# Patient Record
Sex: MALE | NOT HISPANIC OR LATINO | Employment: STUDENT | URBAN - METROPOLITAN AREA
[De-identification: names, ages, dates, MRNs, and addresses within clinical notes are randomized per-mention and may not be internally consistent; named-entity substitution may affect disease eponyms.]

---

## 2018-08-13 ENCOUNTER — APPOINTMENT (OUTPATIENT)
Dept: CT IMAGING | Facility: CLINIC | Age: 28
End: 2018-08-13
Attending: EMERGENCY MEDICINE
Payer: MEDICAID

## 2018-08-13 ENCOUNTER — HOSPITAL ENCOUNTER (EMERGENCY)
Facility: CLINIC | Age: 28
Discharge: HOME | End: 2018-08-13
Attending: EMERGENCY MEDICINE | Admitting: EMERGENCY MEDICINE
Payer: MEDICAID

## 2018-08-13 VITALS
HEIGHT: 72 IN | BODY MASS INDEX: 26.01 KG/M2 | DIASTOLIC BLOOD PRESSURE: 93 MMHG | HEART RATE: 95 BPM | SYSTOLIC BLOOD PRESSURE: 139 MMHG | WEIGHT: 192 LBS | RESPIRATION RATE: 14 BRPM | TEMPERATURE: 99.1 F | OXYGEN SATURATION: 100 %

## 2018-08-13 DIAGNOSIS — J45.20 MILD INTERMITTENT REACTIVE AIRWAY DISEASE WITHOUT COMPLICATION: ICD-10-CM

## 2018-08-13 DIAGNOSIS — R07.89 OTHER CHEST PAIN: ICD-10-CM

## 2018-08-13 DIAGNOSIS — J18.9 PNEUMONIA DUE TO INFECTIOUS ORGANISM, UNSPECIFIED LATERALITY, UNSPECIFIED PART OF LUNG: ICD-10-CM

## 2018-08-13 DIAGNOSIS — I10 HYPERTENSION, UNSPECIFIED TYPE: ICD-10-CM

## 2018-08-13 LAB
ANION GAP SERPL CALCULATED.3IONS-SCNC: 5 MMOL/L (ref 3–14)
APTT PPP: 34 SEC (ref 22–37)
BASOPHILS # BLD AUTO: 0.1 10E9/L (ref 0–0.2)
BASOPHILS NFR BLD AUTO: 0.4 %
BUN SERPL-MCNC: 10 MG/DL (ref 7–30)
CALCIUM SERPL-MCNC: 9.1 MG/DL (ref 8.5–10.1)
CHLORIDE SERPL-SCNC: 102 MMOL/L (ref 94–109)
CO2 SERPL-SCNC: 27 MMOL/L (ref 20–32)
CREAT SERPL-MCNC: 0.99 MG/DL (ref 0.66–1.25)
DIFFERENTIAL METHOD BLD: ABNORMAL
EOSINOPHIL # BLD AUTO: 0.3 10E9/L (ref 0–0.7)
EOSINOPHIL NFR BLD AUTO: 1.9 %
ERYTHROCYTE [DISTWIDTH] IN BLOOD BY AUTOMATED COUNT: 15.4 % (ref 10–15)
GFR SERPL CREATININE-BSD FRML MDRD: 89 ML/MIN/1.7M2
GLUCOSE SERPL-MCNC: 93 MG/DL (ref 70–99)
HCT VFR BLD AUTO: 40.5 % (ref 40–53)
HGB BLD-MCNC: 13.3 G/DL (ref 13.3–17.7)
IMM GRANULOCYTES # BLD: 0 10E9/L (ref 0–0.4)
IMM GRANULOCYTES NFR BLD: 0.2 %
INR PPP: 1.05 (ref 0.86–1.14)
LIPASE SERPL-CCNC: 115 U/L (ref 73–393)
LYMPHOCYTES # BLD AUTO: 2.7 10E9/L (ref 0.8–5.3)
LYMPHOCYTES NFR BLD AUTO: 16.8 %
MAGNESIUM SERPL-MCNC: 2 MG/DL (ref 1.6–2.3)
MCH RBC QN AUTO: 23.2 PG (ref 26.5–33)
MCHC RBC AUTO-ENTMCNC: 32.8 G/DL (ref 31.5–36.5)
MCV RBC AUTO: 71 FL (ref 78–100)
MONOCYTES # BLD AUTO: 2.2 10E9/L (ref 0–1.3)
MONOCYTES NFR BLD AUTO: 13.3 %
NEUTROPHILS # BLD AUTO: 10.9 10E9/L (ref 1.6–8.3)
NEUTROPHILS NFR BLD AUTO: 67.4 %
NRBC # BLD AUTO: 0 10*3/UL
NRBC BLD AUTO-RTO: 0 /100
PLATELET # BLD AUTO: 242 10E9/L (ref 150–450)
PLATELET # BLD EST: ABNORMAL 10*3/UL
POTASSIUM SERPL-SCNC: 4 MMOL/L (ref 3.4–5.3)
RBC # BLD AUTO: 5.74 10E12/L (ref 4.4–5.9)
SODIUM SERPL-SCNC: 135 MMOL/L (ref 133–144)
TROPONIN I SERPL-MCNC: <0.015 UG/L (ref 0–0.04)
TROPONIN I SERPL-MCNC: <0.015 UG/L (ref 0–0.04)
TSH SERPL DL<=0.005 MIU/L-ACNC: 0.76 MU/L (ref 0.4–4)
WBC # BLD AUTO: 16.2 10E9/L (ref 4–11)

## 2018-08-13 PROCEDURE — 25000128 H RX IP 250 OP 636: Performed by: EMERGENCY MEDICINE

## 2018-08-13 PROCEDURE — 85610 PROTHROMBIN TIME: CPT | Performed by: EMERGENCY MEDICINE

## 2018-08-13 PROCEDURE — 80048 BASIC METABOLIC PNL TOTAL CA: CPT | Performed by: EMERGENCY MEDICINE

## 2018-08-13 PROCEDURE — 99285 EMERGENCY DEPT VISIT HI MDM: CPT | Mod: 25

## 2018-08-13 PROCEDURE — 25000125 ZZHC RX 250: Performed by: EMERGENCY MEDICINE

## 2018-08-13 PROCEDURE — 85025 COMPLETE CBC W/AUTO DIFF WBC: CPT | Performed by: EMERGENCY MEDICINE

## 2018-08-13 PROCEDURE — 83690 ASSAY OF LIPASE: CPT | Performed by: EMERGENCY MEDICINE

## 2018-08-13 PROCEDURE — 25000132 ZZH RX MED GY IP 250 OP 250 PS 637: Performed by: EMERGENCY MEDICINE

## 2018-08-13 PROCEDURE — 96375 TX/PRO/DX INJ NEW DRUG ADDON: CPT | Mod: 59

## 2018-08-13 PROCEDURE — 96361 HYDRATE IV INFUSION ADD-ON: CPT | Mod: 59

## 2018-08-13 PROCEDURE — 99285 EMERGENCY DEPT VISIT HI MDM: CPT | Mod: 25 | Performed by: EMERGENCY MEDICINE

## 2018-08-13 PROCEDURE — 71260 CT THORAX DX C+: CPT

## 2018-08-13 PROCEDURE — 93005 ELECTROCARDIOGRAM TRACING: CPT

## 2018-08-13 PROCEDURE — 94640 AIRWAY INHALATION TREATMENT: CPT | Performed by: EMERGENCY MEDICINE

## 2018-08-13 PROCEDURE — 84443 ASSAY THYROID STIM HORMONE: CPT | Performed by: EMERGENCY MEDICINE

## 2018-08-13 PROCEDURE — 84484 ASSAY OF TROPONIN QUANT: CPT | Performed by: EMERGENCY MEDICINE

## 2018-08-13 PROCEDURE — 93010 ELECTROCARDIOGRAM REPORT: CPT | Mod: Z6 | Performed by: EMERGENCY MEDICINE

## 2018-08-13 PROCEDURE — 83735 ASSAY OF MAGNESIUM: CPT | Performed by: EMERGENCY MEDICINE

## 2018-08-13 PROCEDURE — 84484 ASSAY OF TROPONIN QUANT: CPT | Mod: 91 | Performed by: EMERGENCY MEDICINE

## 2018-08-13 PROCEDURE — 85730 THROMBOPLASTIN TIME PARTIAL: CPT | Performed by: EMERGENCY MEDICINE

## 2018-08-13 PROCEDURE — 96374 THER/PROPH/DIAG INJ IV PUSH: CPT

## 2018-08-13 RX ORDER — DEXAMETHASONE SODIUM PHOSPHATE 10 MG/ML
10 INJECTION, SOLUTION INTRAMUSCULAR; INTRAVENOUS ONCE
Status: COMPLETED | OUTPATIENT
Start: 2018-08-13 | End: 2018-08-13

## 2018-08-13 RX ORDER — ALBUTEROL SULFATE 90 UG/1
2 AEROSOL, METERED RESPIRATORY (INHALATION) EVERY 4 HOURS PRN
Qty: 1 INHALER | Refills: 0 | Status: SHIPPED | OUTPATIENT
Start: 2018-08-13

## 2018-08-13 RX ORDER — KETOROLAC TROMETHAMINE 15 MG/ML
15 INJECTION, SOLUTION INTRAMUSCULAR; INTRAVENOUS ONCE
Status: COMPLETED | OUTPATIENT
Start: 2018-08-13 | End: 2018-08-13

## 2018-08-13 RX ORDER — AZITHROMYCIN 250 MG/1
TABLET, FILM COATED ORAL
Qty: 6 TABLET | Refills: 0 | Status: SHIPPED | OUTPATIENT
Start: 2018-08-13 | End: 2018-08-18

## 2018-08-13 RX ORDER — IPRATROPIUM BROMIDE AND ALBUTEROL SULFATE 2.5; .5 MG/3ML; MG/3ML
3 SOLUTION RESPIRATORY (INHALATION) ONCE
Status: COMPLETED | OUTPATIENT
Start: 2018-08-13 | End: 2018-08-13

## 2018-08-13 RX ORDER — SODIUM CHLORIDE 9 MG/ML
1000 INJECTION, SOLUTION INTRAVENOUS CONTINUOUS
Status: DISCONTINUED | OUTPATIENT
Start: 2018-08-13 | End: 2018-08-14 | Stop reason: HOSPADM

## 2018-08-13 RX ORDER — IOPAMIDOL 755 MG/ML
65 INJECTION, SOLUTION INTRAVASCULAR ONCE
Status: COMPLETED | OUTPATIENT
Start: 2018-08-13 | End: 2018-08-13

## 2018-08-13 RX ORDER — AZITHROMYCIN 250 MG/1
500 TABLET, FILM COATED ORAL ONCE
Status: COMPLETED | OUTPATIENT
Start: 2018-08-13 | End: 2018-08-13

## 2018-08-13 RX ORDER — LORATADINE 10 MG/1
10 TABLET ORAL DAILY
COMMUNITY
End: 2019-11-19

## 2018-08-13 RX ADMIN — AZITHROMYCIN 500 MG: 250 TABLET, FILM COATED ORAL at 22:03

## 2018-08-13 RX ADMIN — DEXAMETHASONE SODIUM PHOSPHATE 10 MG: 10 INJECTION, SOLUTION INTRAMUSCULAR; INTRAVENOUS at 21:01

## 2018-08-13 RX ADMIN — IPRATROPIUM BROMIDE AND ALBUTEROL SULFATE 3 ML: .5; 3 SOLUTION RESPIRATORY (INHALATION) at 21:01

## 2018-08-13 RX ADMIN — SODIUM CHLORIDE 1000 ML: 9 INJECTION, SOLUTION INTRAVENOUS at 19:28

## 2018-08-13 RX ADMIN — IOPAMIDOL 65 ML: 755 INJECTION, SOLUTION INTRAVENOUS at 20:42

## 2018-08-13 RX ADMIN — KETOROLAC TROMETHAMINE 15 MG: 15 INJECTION, SOLUTION INTRAMUSCULAR; INTRAVENOUS at 19:31

## 2018-08-13 NOTE — ED AVS SNAPSHOT
[facility], [place], Emergency Department    [address] [place] [address]    Phone:  [phone]                                       Kesha Allen   MRN: [id number]    Department:  [facility], Emergency Department   Date of Visit:  8/13/2018           After Visit Summary Signature Page     I have received my discharge instructions, and my questions have been answered. I have discussed any challenges I see with this plan with the nurse or doctor.    ..........................................................................................................................................  Patient/Patient Representative Signature      ..........................................................................................................................................  Patient Representative Print Name and Relationship to Patient    ..................................................               ................................................  Date                                            Time    ..........................................................................................................................................  Reviewed by Signature/Title    ...................................................              ..............................................  Date                                                            Time

## 2018-08-13 NOTE — ED TRIAGE NOTES
Coming with chest pain. Describes pain tight and being heavy. No SOA. Pain has been on and off the last three days. Yesterday developed a non productive cough.

## 2018-08-13 NOTE — ED AVS SNAPSHOT
[facility], Emergency Department    [address] [facility] [address]    Phone:  [phone]                                       Kesha Allen   MRN: [id number]    Department:  [facility], Emergency Department   Date of Visit:  8/13/2018           Patient Information     Date Of Birth          1990        Your diagnoses for this visit were:     Other chest pain Atypical    Hypertension, unspecified type     Pneumonia due to infectious organism, unspecified laterality, unspecified part of lung     Mild intermittent reactive airway disease without complication        You were seen by Oliver Boogie MD.      Follow-up Information     Follow up with [facility] In 2 days.    Specialties:  Family Medicine, Internal Medicine    Why:  Make sure you are getting better and to see if any other tests or treatments will be needed    Contact information:    [address]  [address]  [facility] [address] [phone]        Discharge Instructions           Pneumonia (Adult)  Pneumonia is an infection deep within the lungs. It is in the small air sacs (alveoli). Pneumonia may be caused by a virus or bacteria. Pneumonia caused by bacteria is usually treated with an antibiotic. Severe cases may need to be treated in the hospital. Milder cases can be treated at home. Symptoms usually start to get better during the first 2 days of treatment.    Home care  Follow these guidelines when caring for yourself at home:    Rest at home for the first 2 to 3 days, or until you feel stronger. Don t let yourself get overly tired when you go back to your activities.    Stay away from cigarette smoke - yours or other people s.    You may use acetaminophen or ibuprofen to control fever or pain, unless another medicine was prescribed. If you have chronic liver or kidney disease, talk with your healthcare provider before using these medicines. Also talk with your  provider if you ve had a stomach ulcer or gastrointestinal bleeding. Don t give aspirin to anyone younger than 18 years of age who is ill with a fever. It may cause severe liver damage.    Your appetite may be poor, so a light diet is fine.    Drink 6 to 8 glasses of fluids every day to make sure you are getting enough fluids. Beverages can include water, sport drinks, sodas without caffeine, juices, tea, or soup. Fluids will help loosen secretions in the lung. This will make it easier for you to cough up the phlegm (sputum). If you also have heart or kidney disease, check with your healthcare provider before you drink extra fluids.    Take antibiotic medicine prescribed until it is all gone, even if you are feeling better after a few days.  Follow-up care  Follow up with your healthcare provider in the next 2 to 3 days, or as advised. This is to be sure the medicine is helping you get better.  If you are 65 or older, you should get a pneumococcal vaccine and a yearly flu (influenza) shot. You should also get these vaccines if you have chronic lung disease like asthma, emphysema, or COPD. Recently, a second type of pneumonia vaccine has become available for everyone over 65 years old. This is in addition to the previous vaccine. Ask your provider about this.  When to seek medical advice  Call your healthcare provider right away if any of these occur:    You don t get better within the first 48 hours of treatment    Shortness of breath gets worse    Rapid breathing (more than 25 breaths per minute)    Coughing up blood    Chest pain gets worse with breathing    Fever of 100.4 F (38 C) or higher that doesn t get better with fever medicine    Weakness, dizziness, or fainting that gets worse    Thirst or dry mouth that gets worse    Sinus pain, headache, or a stiff neck    Chest pain not caused by coughing  Date Last Reviewed: 1/1/2017 2000-2017 [employer]. [address] [facility], [place] [address].  All rights reserved. This information is not intended as a substitute for professional medical care. Always follow your healthcare professional's instructions.        Taking Your Blood Pressure  Blood pressure is the force of blood against the artery wall as it moves from the heart through the blood vessels. You can take your own blood pressure reading using a digital monitor. Take your readings the same each time, using the same arm. Take readings as often as your healthcare provider instructs.  About blood pressure monitors  Blood pressure monitors are designed for certain ages and cases. You can find monitors for older adults, for pregnant women, and for children. Make sure the one you choose is the right one for your age and situation.  The American Heart Association recommends an automatic cuff monitor that fits on your upper arm (bicep). The cuff should fit your arm size. A cuff that s too large or too small will not give an accurate reading. Measure around your upper arm to find your size.  Monitors that attach to your finger or wrist are not as accurate as monitors for your upper arm.  Ask your healthcare provider for help in choosing a monitor. Bring your monitor to your next provider visit if you need help in using it the correct way.  The steps below are general instructions for using an automatic digital monitor.  Step 1. Relax      Take your blood pressure at the same time every day, such as in the morning or evening, or at the time your healthcare provider recommends.    Wait at least a half-hour after smoking, eating, or exercising. Don't drink coffee, tea, soda, or other caffeinated beverages before checking your blood pressure.    Sit comfortably at a table with both feet on the floor. Do not cross your legs or feet. Place the monitor near you.    Rest for a few minutes before you begin.  Step 2. Wrap the cuff      Place your arm on the table, palm up. Your arm should be at the level of your heart.  Wrap the cuff around your upper arm, just above your elbow. It s best done on bare skin, not over clothing. Most cuffs will indicate where the brachial artery (the blood vessel in the middle of the arm at the inner side of the elbow) should line up with the cuff. Look in your monitor's instruction booklet for an illustration. You can also bring your cuff to your healthcare provider and have them show you how to correctly place the cuff.  Step 3. Inflate the cuff      Push the button that starts the pump.    The cuff will tighten, then loosen.    The numbers will change. When they stop changing, your blood pressure reading will appear.    Take 2 or 3 readings one minute apart.  Step 4. Write down the results of each reading      Write down your blood pressure numbers for each reading. Note the date and time. Keep your results in one place, such as a notebook. Even if your monitor has a built-in memory, keep a hard copy of the readings.    Remove the cuff from your arm. Turn off the machine.    Bring your blood pressure records with your healthcare providers at each visit.    If you start a new blood pressure medicine, note the day you started the new medicine. Also note the day if you change the dose of your medicine. This information goes on your blood pressure recording sheet. This will help your healthcare provider monitor how well the medicine changes are working.    Ask your healthcare provider what numbers should prompt you to call him or her. Also ask what numbers should prompt you to get help right away.  Date Last Reviewed: 11/1/2016 2000-2017 [employer]. [address] [place] [address], [place] [address]. All rights reserved. This information is not intended as a substitute for professional medical care. Always follow your healthcare professional's instructions.          Step-by-Step  Checking Your Blood Pressure    Date Last Reviewed: 4/27/2016 2000-2017 [employer]. [address] [place] [address]  [place] [address]. All rights reserved. This information is not intended as a substitute for professional medical care. Always follow your healthcare professional's instructions.           *CHEST PAIN, UNCERTAIN CAUSE    Based on your exam today, the exact cause of your chest pain is not certain. Your condition does not seem serious at this time, and your pain does not appear to be coming from your heart. However, sometimes the signs of a serious problem take more time to appear. Therefore, watch for the warning signs listed below.  HOME CARE:    1. Rest today and avoid strenuous activity.  2. Take any prescribed medicine as directed.  FOLLOW UP with your doctor in 1-3 days.   GET PROMPT MEDICAL ATTENTION if any of the following occur:    A change in the type of pain: if it feels different, becomes more severe, lasts longer, or begins to spread into your shoulder, arm, neck, jaw or back    Shortness of breath or increased pain with breathing    Weakness, dizziness, or fainting    Cough with blood or dark colored sputum (phlegm)    Fever over 101  F (38.3  C)    Swelling, pain or redness in one leg    0562-7513 The [employer]. [address] [place] [address]. All rights reserved. This information is not intended as a substitute for professional medical care. Always follow your healthcare professional's instructions.  This information has been modified by your health care provider with permission from the publisher.    It is very important that you follow-up with your primary care doctor  to make sure that you are getting better and to see if you will need any other test or treatments.  While in the emergency department, we noted your blood pressure was high and this means that you need to follow-up with your doctor to see if you will need treatment for this.  If fevers nausea vomiting worse pain or if any other concerns develop please return to my department immediately    24 Hour  Appointment Hotline       To make an appointment at any [facility], call [phone] ([phone]). If you don't have a family doctor or clinic, we will help you find one. [facility] are conveniently located to serve the needs of you and your family.             Review of your medicines      START taking        Dose / Directions Last dose taken    albuterol 108 (90 Base) MCG/ACT inhaler   Commonly known as:  PROAIR HFA   Dose:  2 puff   Quantity:  1 Inhaler        Inhale 2 puffs into the lungs every 4 hours as needed for shortness of breath / dyspnea   Refills:  0        azithromycin 250 MG tablet   Commonly known as:  ZITHROMAX Z-SULEMAN   Quantity:  6 tablet        Two tablets on the first day, then one tablet daily for the next 4 days   Refills:  0          Our records show that you are taking the medicines listed below. If these are incorrect, please call your family doctor or clinic.        Dose / Directions Last dose taken    BENADRYL PO        Refills:  0        loratadine 10 MG tablet   Commonly known as:  CLARITIN   Dose:  10 mg        Take 10 mg by mouth daily   Refills:  0                Prescriptions were sent or printed at these locations (2 Prescriptions)                   Other Prescriptions                Printed at Department/Unit printer (2 of 2)         albuterol (PROAIR HFA) 108 (90 Base) MCG/ACT inhaler               azithromycin (ZITHROMAX Z-SULEMAN) 250 MG tablet                Procedures and tests performed during your visit     Procedure/Test Number of Times Performed    Basic metabolic panel 1    CBC with platelets differential 1    CT Chest Pulmonary Embolism w Contrast 1    EKG 12 lead 1    EKG 12-lead, tracing only 1    INR 1    Lipase 1    Magnesium 1    Partial thromboplastin time 1    Peripheral IV catheter 1    TSH 1    Troponin I 2      Orders Needing Specimen Collection     None      Pending Results     Date and Time Order Name Status Description    8/13/2018 6341 EKG  "12-lead, tracing only Preliminary             Pending Culture Results     No orders found from 2018 to 2018.            Pending Results Instructions     If you had any lab results that were not finalized at the time of your Discharge, you can call the ED Lab Result RN at [phone]. You will be contacted by this team for any positive Lab results or changes in treatment. The nurses are available 7 days a week from 10A to 6:30P.  You can leave a message 24 hours per day and they will return your call.        Thank you for choosing [facility]       Thank you for choosing [facility] for your care. Our goal is always to provide you with excellent care. Hearing back from our patients is one way we can continue to improve our services. Please take a few minutes to complete the written survey that you may receive in the mail after you visit with us. Thank you!        [employer] Information     [employer] lets you send messages to your doctor, view your test results, renew your prescriptions, schedule appointments and more. To sign up, go to [email or web address] . Click on \"Log in\" on the left side of the screen, which will take you to the Welcome page. Then click on \"Sign up Now\" on the right side of the page.     You will be asked to enter the access code listed below, as well as some personal information. Please follow the directions to create your username and password.     Your access code is: [email or web address]  Expires: 2018 11:06 PM     Your access code will  in 90 days. If you need help or a new code, please call your [facility] clinic or [phone].        Care EveryWhere ID     This is your Care EveryWhere ID. This could be used by other organizations to access your [facility] medical records  [id number]        Equal Access to Services     ROMINA PEDRAZA : anne Mendez, honorio estes. So warip " [phone].    ATENCIÓN: Si habla español, tiene a mosqueda disposición servicios gratuitos de asistencia lingüística. Llame al [phone].    We comply with applicable federal civil rights laws and [place] laws. We do not discriminate on the basis of race, color, national origin, age, disability, sex, sexual orientation, or gender identity.            After Visit Summary       This is your record. Keep this with you and show to your community pharmacist(s) and doctor(s) at your next visit.

## 2018-08-14 LAB
INTERPRETATION ECG - MUSE: NORMAL
INTERPRETATION ECG - MUSE: NORMAL

## 2018-08-14 NOTE — DISCHARGE INSTRUCTIONS
Pneumonia (Adult)  Pneumonia is an infection deep within the lungs. It is in the small air sacs (alveoli). Pneumonia may be caused by a virus or bacteria. Pneumonia caused by bacteria is usually treated with an antibiotic. Severe cases may need to be treated in the hospital. Milder cases can be treated at home. Symptoms usually start to get better during the first 2 days of treatment.    Home care  Follow these guidelines when caring for yourself at home:    Rest at home for the first 2 to 3 days, or until you feel stronger. Don t let yourself get overly tired when you go back to your activities.    Stay away from cigarette smoke - yours or other people s.    You may use acetaminophen or ibuprofen to control fever or pain, unless another medicine was prescribed. If you have chronic liver or kidney disease, talk with your healthcare provider before using these medicines. Also talk with your provider if you ve had a stomach ulcer or gastrointestinal bleeding. Don t give aspirin to anyone younger than 18 years of age who is ill with a fever. It may cause severe liver damage.    Your appetite may be poor, so a light diet is fine.    Drink 6 to 8 glasses of fluids every day to make sure you are getting enough fluids. Beverages can include water, sport drinks, sodas without caffeine, juices, tea, or soup. Fluids will help loosen secretions in the lung. This will make it easier for you to cough up the phlegm (sputum). If you also have heart or kidney disease, check with your healthcare provider before you drink extra fluids.    Take antibiotic medicine prescribed until it is all gone, even if you are feeling better after a few days.  Follow-up care  Follow up with your healthcare provider in the next 2 to 3 days, or as advised. This is to be sure the medicine is helping you get better.  If you are 65 or older, you should get a pneumococcal vaccine and a yearly flu (influenza) shot. You should also get these vaccines if  you have chronic lung disease like asthma, emphysema, or COPD. Recently, a second type of pneumonia vaccine has become available for everyone over 65 years old. This is in addition to the previous vaccine. Ask your provider about this.  When to seek medical advice  Call your healthcare provider right away if any of these occur:    You don t get better within the first 48 hours of treatment    Shortness of breath gets worse    Rapid breathing (more than 25 breaths per minute)    Coughing up blood    Chest pain gets worse with breathing    Fever of 100.4 F (38 C) or higher that doesn t get better with fever medicine    Weakness, dizziness, or fainting that gets worse    Thirst or dry mouth that gets worse    Sinus pain, headache, or a stiff neck    Chest pain not caused by coughing  Date Last Reviewed: 1/1/2017 2000-2017 [employer]. [address] [place] [id number]. All rights reserved. This information is not intended as a substitute for professional medical care. Always follow your healthcare professional's instructions.        Taking Your Blood Pressure  Blood pressure is the force of blood against the artery wall as it moves from the heart through the blood vessels. You can take your own blood pressure reading using a digital monitor. Take your readings the same each time, using the same arm. Take readings as often as your healthcare provider instructs.  About blood pressure monitors  Blood pressure monitors are designed for certain ages and cases. You can find monitors for older adults, for pregnant women, and for children. Make sure the one you choose is the right one for your age and situation.  The American Heart Association recommends an automatic cuff monitor that fits on your upper arm (bicep). The cuff should fit your arm size. A cuff that s too large or too small will not give an accurate reading. Measure around your upper arm to find your size.  Monitors that attach to your  finger or wrist are not as accurate as monitors for your upper arm.  Ask your healthcare provider for help in choosing a monitor. Bring your monitor to your next provider visit if you need help in using it the correct way.  The steps below are general instructions for using an automatic digital monitor.  Step 1. Relax      Take your blood pressure at the same time every day, such as in the morning or evening, or at the time your healthcare provider recommends.    Wait at least a half-hour after smoking, eating, or exercising. Don't drink coffee, tea, soda, or other caffeinated beverages before checking your blood pressure.    Sit comfortably at a table with both feet on the floor. Do not cross your legs or feet. Place the monitor near you.    Rest for a few minutes before you begin.  Step 2. Wrap the cuff      Place your arm on the table, palm up. Your arm should be at the level of your heart. Wrap the cuff around your upper arm, just above your elbow. It s best done on bare skin, not over clothing. Most cuffs will indicate where the brachial artery (the blood vessel in the middle of the arm at the inner side of the elbow) should line up with the cuff. Look in your monitor's instruction booklet for an illustration. You can also bring your cuff to your healthcare provider and have them show you how to correctly place the cuff.  Step 3. Inflate the cuff      Push the button that starts the pump.    The cuff will tighten, then loosen.    The numbers will change. When they stop changing, your blood pressure reading will appear.    Take 2 or 3 readings one minute apart.  Step 4. Write down the results of each reading      Write down your blood pressure numbers for each reading. Note the date and time. Keep your results in one place, such as a notebook. Even if your monitor has a built-in memory, keep a hard copy of the readings.    Remove the cuff from your arm. Turn off the machine.    Bring your blood pressure records  with your healthcare providers at each visit.    If you start a new blood pressure medicine, note the day you started the new medicine. Also note the day if you change the dose of your medicine. This information goes on your blood pressure recording sheet. This will help your healthcare provider monitor how well the medicine changes are working.    Ask your healthcare provider what numbers should prompt you to call him or her. Also ask what numbers should prompt you to get help right away.  Date Last Reviewed: 11/1/2016 2000-2017 [employer]. [address] [place] [address]. All rights reserved. This information is not intended as a substitute for professional medical care. Always follow your healthcare professional's instructions.          Step-by-Step  Checking Your Blood Pressure    Date Last Reviewed: 4/27/2016 2000-2017 [employer]. [address] [place] [address]. All rights reserved. This information is not intended as a substitute for professional medical care. Always follow your healthcare professional's instructions.           *CHEST PAIN, UNCERTAIN CAUSE    Based on your exam today, the exact cause of your chest pain is not certain. Your condition does not seem serious at this time, and your pain does not appear to be coming from your heart. However, sometimes the signs of a serious problem take more time to appear. Therefore, watch for the warning signs listed below.  HOME CARE:    1. Rest today and avoid strenuous activity.  2. Take any prescribed medicine as directed.  FOLLOW UP with your doctor in 1-3 days.   GET PROMPT MEDICAL ATTENTION if any of the following occur:    A change in the type of pain: if it feels different, becomes more severe, lasts longer, or begins to spread into your shoulder, arm, neck, jaw or back    Shortness of breath or increased pain with breathing    Weakness, dizziness, or fainting    Cough with blood or dark colored  sputum (phlegm)    Fever over 101  F (38.3  C)    Swelling, pain or redness in one leg    0386-0150 [employer]. [address] [place] [address], [place] [address]. All rights reserved. This information is not intended as a substitute for professional medical care. Always follow your healthcare professional's instructions.  This information has been modified by your health care provider with permission from the publisher.    It is very important that you follow-up with your primary care doctor  to make sure that you are getting better and to see if you will need any other test or treatments.  While in the emergency department, we noted your blood pressure was high and this means that you need to follow-up with your doctor to see if you will need treatment for this.  If fevers nausea vomiting worse pain or if any other concerns develop please return to my department immediately

## 2018-08-14 NOTE — ED PROVIDER NOTES
History     Chief Complaint   Patient presents with     Chest Pain     HPI  Kesha Allen is a 27 year old male with a past medical history of asthma who presents to the Emergency Department today for evaluation of chest pain.  The patient reports that 2 days ago he developed central chest pain that he states has been constant since.  The patient reports that his pain is exacerbated with deep breathing, movement, and coughing.  The patient states that he feels as though there are weights on his chest.  The patient also reports that he does have a cough but denies any production.  The patient also states he does have some tingling in his legs but denies any pain or swelling in his legs.  The patient states that he has used his son's albuterol in the past which has helped, but has not helped his chest pain this time.  The patient denies use of any medications.  The patient does state that he is a smoker.  The patient denies a family history of heart attacks.    I have reviewed the Medications, Allergies, Past Medical and Surgical History, and Social History in the [email or web address] system.    Past Medical History:   Diagnosis Date     Allergic state      Uncomplicated asthma        Past Surgical History:   Procedure Laterality Date     ENT SURGERY         No family history on file.    Social History   Substance Use Topics     Smoking status: Never Smoker     Smokeless tobacco: Never Used     Alcohol use Yes       Current Facility-Administered Medications   Medication     sodium chloride 0.9% infusion     Current Outpatient Prescriptions   Medication     albuterol (PROAIR HFA) 108 (90 Base) MCG/ACT inhaler     azithromycin (ZITHROMAX Z-SULEMAN) 250 MG tablet     DiphenhydrAMINE HCl (BENADRYL PO)     loratadine (CLARITIN) 10 MG tablet      No Known Allergies     Review of Systems  ROS negative except for symptoms noted above in HPI.  Physical Exam   BP: 164/64  Pulse: 95  Heart Rate: 83  Temp: 99.1  F (37.3  C)  Resp: 14  Height:  182.9 cm (6')  Weight: 87.1 kg (192 lb)  SpO2: 99 %    Physical Exam   Constitutional: No distress.   HENT:   Head: Atraumatic.   Mouth/Throat: Oropharynx is clear and moist. No oropharyngeal exudate.   Eyes: Pupils are equal, round, and reactive to light. No scleral icterus.   Cardiovascular: Normal heart sounds and intact distal pulses.  Tachycardia present.    No murmur heard.  Pulmonary/Chest: Breath sounds normal. No respiratory distress.   Abdominal: Soft. Bowel sounds are normal. There is no tenderness.   Musculoskeletal: He exhibits no edema or tenderness.   Skin: Skin is warm. No rash noted. He is not diaphoretic.     ED Course   7:02 PM  The patient was seen and examined by Dr. Boogie in Room HW.    ED Course   History of physical examination and my evaluation I suspect this patient is low risk for acute coronary syndrome.  However patient noted to be mildly tachycardic and hypertensive I plan to evaluate with cardiac enzymes as well as CT Angio chest to rule out the possibility of PE.  If no PE is present, I suspect a second set of cardiac enzymes will be enough to rule out acute coronary syndrome.    Addendum 2020 hrs.  Initial set of enzymes are negative mild leukocytosis noted CT scan still pending.  Since patient said that sometimes when he developed this type of pain if pain gets better with with albuterol inhaler I plan to give him some steroids and a trial of the DuoNeb to see if there is any difference in his symptoms.  Patient agrees    Addendum 2139 hrs.  After the Decadron and the DuoNeb patient is feeling much better he says that his pain has significantly decreased.  CT scan of the chest did not did not show any evidence of PE however small infiltrate was noted consistent with a possible early pneumonia.  I have given the patient some Zithromax and I will discharge him to home after the second set of cardiac enzymes become negative.  Pt did not agrees.    Addendum 2225 hrs. is doing well  pain has resolved suspect his atypical chest pain was secondary to early pneumonia repeat EKG shows no evidence of changes no acute ischemia repeat troponin has already been drawn.  Plan to discharge with p.o. Zithromax and albuterol as well as with follow-up primary care doctor    Addendum 2300 patient still feeling well patient requesting to go home at this point his troponin repeat came back negative EKG remains unchanged we will discharge her to home with follow-up:  tProcedures  None       EKG Interpretation:      Interpreted by Oliver Boogie MD  Time reviewed: 19:00  Symptoms at time of EKG: Chest pain   Rhythm: sinus tachycardia  Rate: Normal  Axis: Normal  Ectopy: none  Conduction: normal  ST Segments/ T Waves: No ST-T wave changes  Q Waves: none  Comparison to prior: None available    Clinical Impression: Abnormal EKG without significant pathology    Critical Care time:  none             Labs Ordered and Resulted from Time of ED Arrival Up to the Time of Departure from the ED   CBC WITH PLATELETS DIFFERENTIAL - Abnormal; Notable for the following:        Result Value    WBC 16.2 (*)     MCV 71 (*)     MCH 23.2 (*)     RDW 15.4 (*)     Absolute Neutrophil 10.9 (*)     Absolute Monocytes 2.2 (*)     All other components within normal limits   TROPONIN I   INR   PARTIAL THROMBOPLASTIN TIME   BASIC METABOLIC PANEL   TROPONIN I   LIPASE   MAGNESIUM   TSH   PERIPHERAL IV CATHETER   CARDIAC CONTINUOUS MONITORING          Assessments & Plan (with Medical Decision Making)   This is a 27-year-old male with no past medical history, denies cardiac medical history in the family although who admits to smoking who comes in for evaluation of atypical chest pain that is been ongoing for 2 days.  According to patient the pain is pleuritic in nature and is associated with some mild cough and shortness of breath.  Initial set of enzymes are negative at this point I plan to add a CT angiogram of the chest to rule out PE.  If  the workup is negative based on heart score criteria I will repeat a single cardiac enzyme to ensure his safety.    I have reviewed the nursing notes.    I have reviewed the findings, diagnosis, plan and need for follow up with the patient.    New Prescriptions    ALBUTEROL (PROAIR HFA) 108 (90 BASE) MCG/ACT INHALER    Inhale 2 puffs into the lungs every 4 hours as needed for shortness of breath / dyspnea    AZITHROMYCIN (ZITHROMAX Z-SULEMAN) 250 MG TABLET    Two tablets on the first day, then one tablet daily for the next 4 days       Final diagnoses:   Other chest pain - Atypical   Hypertension, unspecified type   Pneumonia due to infectious organism, unspecified laterality, unspecified part of lung   Mild intermittent reactive airway disease without complication   I, Wilman Banuelos, am serving as a trained medical scribe to document services personally performed by Oliver Boogie MD, based on the provider's statements to me.   IOliver MD, was physically present and have reviewed and verified the accuracy of this note documented by Wilman Banuelos.     8/13/2018   [facility], EMERGENCY DEPARTMENT     Oliver Boogie MD  08/13/18 [id number]

## 2018-08-16 ENCOUNTER — OFFICE VISIT (OUTPATIENT)
Dept: FAMILY MEDICINE | Facility: CLINIC | Age: 28
End: 2018-08-16
Payer: MEDICAID

## 2018-08-16 VITALS
SYSTOLIC BLOOD PRESSURE: 122 MMHG | TEMPERATURE: 97.5 F | BODY MASS INDEX: 25.67 KG/M2 | OXYGEN SATURATION: 99 % | DIASTOLIC BLOOD PRESSURE: 78 MMHG | RESPIRATION RATE: 14 BRPM | HEART RATE: 85 BPM | HEIGHT: 72 IN | WEIGHT: 189.5 LBS

## 2018-08-16 DIAGNOSIS — J18.9 PNEUMONIA DUE TO INFECTIOUS ORGANISM, UNSPECIFIED LATERALITY, UNSPECIFIED PART OF LUNG: ICD-10-CM

## 2018-08-16 DIAGNOSIS — J45.22 MILD INTERMITTENT ASTHMA WITH STATUS ASTHMATICUS: Primary | ICD-10-CM

## 2018-08-16 DIAGNOSIS — F12.90 MARIJUANA USE: ICD-10-CM

## 2018-08-16 PROCEDURE — 99204 OFFICE O/P NEW MOD 45 MIN: CPT | Performed by: FAMILY MEDICINE

## 2018-08-16 RX ORDER — PREDNISONE 20 MG/1
40 TABLET ORAL DAILY
Qty: 10 TABLET | Refills: 0 | Status: SHIPPED | OUTPATIENT
Start: 2018-08-16 | End: 2018-08-21

## 2018-08-16 NOTE — MR AVS SNAPSHOT
After Visit Summary   8/16/2018    Kesha Allen    MRN: [id number]           Patient Information     Date Of Birth          1990        Visit Information        Provider Department      8/16/2018 8:10 AM Ashley Leija DO [facility]        Today's Diagnoses     Mild intermittent asthma with status asthmaticus    -  1      Care Instructions      Asthma Trigger Checklist  Allergens, irritants, and other things may trigger your asthma. Check the box next to each of your triggers. After each trigger is a list of ways to avoid it.     Dust mites. Dust mites live in mattresses, bedding, carpets, curtains, and indoor dust.    To kill dust mites, wash bedding in hot water (130 F) each week.    Cover mattress and pillows with special dust-mite-proof cases.    Don t use upholstered furniture like sofas or chairs in the bedroom.    Use allergy-proof filters for air conditioners and furnaces. Replace or clean them as instructed.    If you can, replace carpeting with wood or tile jorge, especially in the bedroom.    Animals. Animals with fur or feathers shed dander (allergens).    It s best to choose a pet that doesn t have fur or feathers, such as a fish or a reptile.    If you have pets, keep them off of your bed and out of your bedroom.    Wash your hands and clothes after handling pets.      Mold. Mold grows in damp places, such as bathrooms, basements, and closets.    Ask someone to clean damp areas in your home every week. Or try wearing a face mask while you clean.    Run an exhaust fan while bathing. Or leave a window open in the bathroom.    Repair water leaks in or around your home.    Have someone else cut grass or rake leaves, if possible.    Don t use vaporizers or humidifiers. They encourage mold growth.      Pollen. Pollen from trees, grasses, and weeds is a common allergen. (Flower pollens are generally not a problem).    Try to learn what types of  pollen affect you most. Pollen levels vary depending on the plant, the season, and the time of day.    If possible, use air conditioning instead of opening the windows in your home or car.    Have someone else do yard work, if possible.      Cockroaches. Roaches are found in many homes and produce allergens.    Keep your kitchen clean and dry. A leaky faucet or drain can attract roaches.    Remove garbage from your home daily.    Store food in tightly sealed containers. Wash dishes as soon as they are used.    Use bait stations or traps to control roaches. Avoid using chemical sprays.      Smoke. Smoke may be from cigarettes, cigars, pipes, incense or candles, barbecues or grills, and fireplaces.    Don t smoke. And don t let people smoke in your home or car.    When you travel, ask for nonsmoking rental cars and hotel rooms.    Avoid fireplaces and wood stoves. If you can t, sit away from them. Make sure the smoke is directed outside.    Don t burn incense or use candles.    Move away from smoky outdoor cooking grills.      Smog.  Smog is from car exhaust and other pollution.    Read or listen to local air-quality reports. These let you know when air quality is poor.    Stay indoors as much as you can on smoggy days. If possible, use air conditioning instead of opening the windows.    In your car, set air conditioning to recirculate air, so less pollution gets in.      Strong odors. These include air fresheners, deodorizers, and cleaning products; perfume, deodorant, and other beauty products; incense and candles; and insect sprays and other sprays.    Use scent-free products like deodorant or body lotion.    Avoid using cleaning products with bleach and ammonia. Make your own cleaning solution with white vinegar, baking soda, or mild dish soap.    Use exhaust fans while cooking. Or open a window, if possible.     Avoid perfumes, air fresheners, potpourri, and other scented products.      Other irritants. These  include dust, aerosol sprays, and powders.    Wear a face mask while doing tasks like sanding, dusting, sweeping, and yard work. Open doors and windows if working indoors.    Use pump spray bottles instead of aerosols.    Pour liquid  onto a rag or cloth instead of spraying them.      Weather. Weather conditions can trigger symptoms or make them worse.    Watch for very high or low temperatures, very humid conditions, or a lot of wind, as these conditions can make symptoms worse.    Limit outdoor activity during the type of weather that affects you.    Wear a scarf over your mouth and nose in cold weather.      Colds, flu, and sinus infections. Upper respiratory infections can trigger asthma.    Wash your hands often with soap and warm water or use a hand  containing alcohol.    Get a yearly flu shot. And ask if you should get a pneumonia vaccine.    Take care of your general health. Get plenty of sleep. And eat a variety of healthy foods.      Food additives. Food additives can trigger asthma flare-ups in some people.    Check food labels for sulfites or other similar ingredients. These are often found in foods such as wine, beer, and dried fruits.    Avoid foods that contain these additives.      Medicine. Aspirin, NSAIDS like ibuprofen and naproxen, and heart medicines like beta-blockers may be triggers.    Tell your health care provider if you think certain medicines trigger symptoms.     Be sure to read the labels on over-the-counter medicines. They may have ingredients that cause symptoms for you.     , Emotions. Laughing, crying, or feeling excited are triggers for some people.     To help you stay calm: Try breathing in slowly through your nose for a count of 2 seconds. Close your lips and breathe out for 4 seconds. Repeat.    Try to focus on a soothing image in your mind. This will help relax you and calm your breathing.    Remember to take your daily controller medicines. When you re upset  or under stress, it s easy to forget.      Exercise. For some people, exercise can trigger symptoms. Don t let this keep you from being active.     If you have not been exercising regularly, start slow and work up gradually.    Take all of your medicines as prescribed.    If you use quick-relief medicine, make sure you have it with you when you exercise.    Stop if you have any symptoms. Make sure you talk with your provider about these symptoms.  Date Last Reviewed: 1/1/2017 2000-2017 [employer]. [address] [place] [id number]. All rights reserved. This information is not intended as a substitute for professional medical care. Always follow your healthcare professional's instructions.        Asthma (Adult)  Asthma is a disease where the medium and  small air passages within the lung go into spasm and restrict the flow of air. Inflammation and swelling of the airways cause further blockage. During an acute asthma attack, these factors cause trouble breathing, wheezing, cough and chest tightness.    An asthma attack can be triggered by many things. Common triggers include infections such as the common cold, bronchitis, and pneumonia. Irritants such as smoke or pollutants in the air, very cold air, emotional upset, and exercise can also trigger an attack. In many adults with asthma, allergies to dust, mold, pollen and animal dander can cause an asthma attack. Skipping doses of daily asthma medicine can also bring on an asthma attack.  Asthma can be controlled using the proper medicines prescribed by your healthcare provider and avoiding exposure to known triggers including allergens and irritants.  Home care    Take prescribed medicine exactly at the times advised. If you need medicine such as from a hand held inhaler or aerosol breathing machine more than every 4 hours, contact your healthcare provider or seek immediate medical attention. If prescribed an antibiotic or prednisone, take  "all of the medicine as prescribed, even if you are feeling better after a few days.    Don't smoke. Avoid being exposed to the smoke of others.    Some people with asthma have worsening of their symptoms when they take aspirin and non-steroidal or fever-reducing medicines like ibuprofen and naproxen. Talk to your healthcare provider if you think this may apply to you.  Follow-up care  Follow up with your healthcare provider, or as advised. Always bring all of your current medicines to any appointments with your healthcare provider. Also bring a complete list of medicines even those not taken for asthma. If you don't already have one, talk to your healthcare provider about developing your own \"Asthma Action Plan.\"  A pneumococcal (pneumonia) vaccine and yearly flu shot (every fall) are recommended. Ask your doctor about this.  When to seek medical advice  Call your healthcare provider right away if any of these occur:     Increased wheezing or shortness of breath    Need to use your inhalers more often than usual without relief    Fever of 100.4 F (38 C) or higher, or as directed by your healthcare provider    Coughing up lots of dark-colored or bloody sputum (mucus)    Chest pain with each breath    If you use a peak flow meter as part of an Asthma Action Plan, and you are still in the yellow zone (50% to 80%) 15 minutes after using inhaler medicine.  Call 911  Call 911 if any of the following occur    Trouble walking or talking because of shortness of breath    If you use a peak flow meter as part of an Asthma Action Plan and you are still in the red zone (less than 50%) 15 minutes after using inhaler medicine    Lips or fingernails turning gray or blue  Date Last Reviewed: 5/1/2017 2000-2017 [employer]. [address] [place] [address]. All rights reserved. This information is not intended as a substitute for professional medical care. Always follow your healthcare professional's " "instructions.                Follow-ups after your visit        Who to contact     If you have questions or need follow up information about today's clinic visit or your schedule please contact [facility] directly at [phone].  Normal or non-critical lab and imaging results will be communicated to you by Stratus5hart, letter or phone within 4 business days after the clinic has received the results. If you do not hear from us within 7 days, please contact the clinic through MyChart or phone. If you have a critical or abnormal lab result, we will notify you by phone as soon as possible.  Submit refill requests through [employer]ile or call your pharmacy and they will forward the refill request to us. Please allow 3 business days for your refill to be completed.          Additional Information About Your Visit        Stratus5harTriviaPad Information     Chelaile lets you send messages to your doctor, view your test results, renew your prescriptions, schedule appointments and more. To sign up, go to [email or web address] . Click on \"Log in\" on the left side of the screen, which will take you to the Welcome page. Then click on \"Sign up Now\" on the right side of the page.     You will be asked to enter the access code listed below, as well as some personal information. Please follow the directions to create your username and password.     Your access code is: [email or web address]  Expires: 2018 11:06 PM     Your access code will  in 90 days. If you need help or a new code, please call your [facility] or [phone].        Care EveryWhere ID     This is your Care EveryWhere ID. This could be used by other organizations to access your [facility] medical records  [id number]        Your Vitals Were     Pulse Temperature Respirations Height Pulse Oximetry BMI (Body Mass Index)    85 97.5  F (36.4  C) (Tympanic) 14 6' (1.829 m) 99% 25.7 kg/m2       Blood Pressure from Last 3 Encounters:   18 122/78 "   08/13/18 (!) 139/93    Weight from Last 3 Encounters:   08/16/18 189 lb 8 oz (86 kg)   08/13/18 192 lb (87.1 kg)              Today, you had the following     No orders found for display         Today's Medication Changes          These changes are accurate as of 8/16/18  8:40 AM.  If you have any questions, ask your nurse or doctor.               Start taking these medicines.        Dose/Directions    predniSONE 20 MG tablet   Commonly known as:  DELTASONE   Used for:  Mild intermittent asthma with status asthmaticus   Started by:  Ashley Leija DO        Dose:  40 mg   Take 2 tablets (40 mg) by mouth daily for 5 days   Quantity:  10 tablet   Refills:  0            Where to get your medicines      Some of these will need a paper prescription and others can be bought over the counter.  Ask your nurse if you have questions.     Bring a paper prescription for each of these medications     predniSONE 20 MG tablet                Primary Care Provider Fax #    Physician No Ref-Primary [phone]       No address on file        Equal Access to Services     ROMINA PEDRAZA : Kenya Leal, waaxda luanshu, qaybta kaalmada adekaylee, honorio eaton . So [facility] [phone].    ATENCIÓN: Si habla español, tiene a mosqueda disposición servicios gratuitos de asistencia lingüística. Llame al [phone].    We comply with applicable federal civil rights laws and [place] laws. We do not discriminate on the basis of race, color, national origin, age, disability, sex, sexual orientation, or gender identity.            Thank you!     Thank you for choosing [facility]  for your care. Our goal is always to provide you with excellent care. Hearing back from our patients is one way we can continue to improve our services. Please take a few minutes to complete the written survey that you may receive in the mail after your visit with us. Thank you!              Your Updated Medication List - Protect others around you: Learn how to safely use, store and throw away your medicines at www.disposemymeds.org.          This list is accurate as of 8/16/18  8:40 AM.  Always use your most recent med list.                   Brand Name Dispense Instructions for use Diagnosis    albuterol 108 (90 Base) MCG/ACT inhaler    PROAIR HFA    1 Inhaler    Inhale 2 puffs into the lungs every 4 hours as needed for shortness of breath / dyspnea        azithromycin 250 MG tablet    ZITHROMAX Z-SULEMAN    6 tablet    Two tablets on the first day, then one tablet daily for the next 4 days        BENADRYL PO           loratadine 10 MG tablet    CLARITIN     Take 10 mg by mouth daily        predniSONE 20 MG tablet    DELTASONE    10 tablet    Take 2 tablets (40 mg) by mouth daily for 5 days    Mild intermittent asthma with status asthmaticus

## 2018-08-16 NOTE — PROGRESS NOTES
SUBJECTIVE:   Kesha Allen is a 27 year old male who presents to clinic today for the following health issues:    ED/UC Followup:    [facility]  Date of visit: 08/13/2018  Reason for visit: Chest pain, cough  Current Status: Chest tightness, cough continues.       Was seen in the ED on 8/13/18 and was diagnosed with possible PNA and asthma flare.  Was given IV steroid but no prednisone script.  Given Z-pack and albuterol inhaler which he is using.    Chest and breathing still feels tight but getting better.  No fever.       Problem list and histories reviewed & adjusted, as indicated.  Additional history: as documented    Labs reviewed in EPIC    Reviewed and updated as needed this visit by clinical staff  Tobacco  Allergies  Meds  Problems  Med Hx  Surg Hx  Fam Hx  Soc Hx        Reviewed and updated as needed this visit by Provider  Allergies  Meds  Problems         ROS:  CONSTITUTIONAL: NEGATIVE for fever, chills, change in weight  INTEGUMENTARY/SKIN: NEGATIVE for worrisome rashes, moles or lesions  EYES: NEGATIVE for vision changes or irritation  ENT/MOUTH: NEGATIVE for ear, mouth and throat problems  CV: NEGATIVE for chest pain, palpitations or peripheral edema  GI: NEGATIVE for nausea, abdominal pain, heartburn, or change in bowel habits  : NEGATIVE for frequency, dysuria, or hematuria  MUSCULOSKELETAL: NEGATIVE for significant arthralgias or myalgia  NEURO: NEGATIVE for weakness, dizziness or paresthesias  HEME: NEGATIVE for bleeding problems    OBJECTIVE:     /78 (BP Location: Left arm, Patient Position: Sitting, Cuff Size: Adult Regular)  Pulse 85  Temp 97.5  F (36.4  C) (Tympanic)  Resp 14  Ht 6' (1.829 m)  Wt 189 lb 8 oz (86 kg)  SpO2 99%  BMI 25.7 kg/m2  Body mass index is 25.7 kg/(m^2).   GENERAL: Alert and no distress  EYES: Eyes grossly normal to inspection, PERRL and conjunctivae and sclerae normal  HENT: ear canals and TM's normal, nose and mouth without ulcers or  lesions  NECK: no adenopathy, no asymmetry, masses, or scars and thyroid normal to palpation  RESP: no rales , no rhonchi.  +expiratory wheezes bilateral and throughout  CV: regular rate and rhythm, normal S1 S2, no S3 or S4, no murmur, click or rub, no peripheral edema and peripheral pulses strong  ABDOMEN: soft, nontender, no hepatosplenomegaly, no masses and bowel sounds normal  MS: no gross musculoskeletal defects noted, no edema  SKIN: no suspicious lesions or rashes  NEURO: Normal strength and tone, mentation intact and speech normal  PSYCH: mentation appears normal, affect normal/bright    Diagnostic Test Results:  none     ASSESSMENT/PLAN:     Problem List Items Addressed This Visit     Mild intermittent asthma with status asthmaticus - Primary    Relevant Medications    predniSONE (DELTASONE) 20 MG tablet      Other Visit Diagnoses     Pneumonia due to infectious organism, unspecified laterality, unspecified part of lung        Marijuana use             --Continue/finish Z-pack.    --Admits marijuana use--advised him to abstain from Marijuana.  No tobacco abuse as well.  --Schedule Albuterol 1-2 puffs every 4-6 hours over the next 2 days, then PRN afterwards.   --Will RTC in 1 week or sooner if needed to check-up.  May also call if needing a longer prednisone taper.    Ashley Leija, DO  [facility]

## 2018-08-16 NOTE — PATIENT INSTRUCTIONS
Asthma Trigger Checklist  Allergens, irritants, and other things may trigger your asthma. Check the box next to each of your triggers. After each trigger is a list of ways to avoid it.     Dust mites. Dust mites live in mattresses, bedding, carpets, curtains, and indoor dust.    To kill dust mites, wash bedding in hot water (130 F) each week.    Cover mattress and pillows with special dust-mite-proof cases.    Don t use upholstered furniture like sofas or chairs in the bedroom.    Use allergy-proof filters for air conditioners and furnaces. Replace or clean them as instructed.    If you can, replace carpeting with wood or tile jorge, especially in the bedroom.    Animals. Animals with fur or feathers shed dander (allergens).    It s best to choose a pet that doesn t have fur or feathers, such as a fish or a reptile.    If you have pets, keep them off of your bed and out of your bedroom.    Wash your hands and clothes after handling pets.      Mold. Mold grows in damp places, such as bathrooms, basements, and closets.    Ask someone to clean damp areas in your home every week. Or try wearing a face mask while you clean.    Run an exhaust fan while bathing. Or leave a window open in the bathroom.    Repair water leaks in or around your home.    Have someone else cut grass or rake leaves, if possible.    Don t use vaporizers or humidifiers. They encourage mold growth.      Pollen. Pollen from trees, grasses, and weeds is a common allergen. (Flower pollens are generally not a problem).    Try to learn what types of pollen affect you most. Pollen levels vary depending on the plant, the season, and the time of day.    If possible, use air conditioning instead of opening the windows in your home or car.    Have someone else do yard work, if possible.      Cockroaches. Roaches are found in many homes and produce allergens.    Keep your kitchen clean and dry. A leaky faucet or drain can attract roaches.    Remove  garbage from your home daily.    Store food in tightly sealed containers. Wash dishes as soon as they are used.    Use bait stations or traps to control roaches. Avoid using chemical sprays.      Smoke. Smoke may be from cigarettes, cigars, pipes, incense or candles, barbecues or grills, and fireplaces.    Don t smoke. And don t let people smoke in your home or car.    When you travel, ask for nonsmoking rental cars and hotel rooms.    Avoid fireplaces and wood stoves. If you can t, sit away from them. Make sure the smoke is directed outside.    Don t burn incense or use candles.    Move away from smoky outdoor cooking grills.      Smog.  Smog is from car exhaust and other pollution.    Read or listen to local air-quality reports. These let you know when air quality is poor.    Stay indoors as much as you can on smoggy days. If possible, use air conditioning instead of opening the windows.    In your car, set air conditioning to recirculate air, so less pollution gets in.      Strong odors. These include air fresheners, deodorizers, and cleaning products; perfume, deodorant, and other beauty products; incense and candles; and insect sprays and other sprays.    Use scent-free products like deodorant or body lotion.    Avoid using cleaning products with bleach and ammonia. Make your own cleaning solution with white vinegar, baking soda, or mild dish soap.    Use exhaust fans while cooking. Or open a window, if possible.     Avoid perfumes, air fresheners, potpourri, and other scented products.      Other irritants. These include dust, aerosol sprays, and powders.    Wear a face mask while doing tasks like sanding, dusting, sweeping, and yard work. Open doors and windows if working indoors.    Use pump spray bottles instead of aerosols.    Pour liquid  onto a rag or cloth instead of spraying them.      Weather. Weather conditions can trigger symptoms or make them worse.    Watch for very high or low  temperatures, very humid conditions, or a lot of wind, as these conditions can make symptoms worse.    Limit outdoor activity during the type of weather that affects you.    Wear a scarf over your mouth and nose in cold weather.      Colds, flu, and sinus infections. Upper respiratory infections can trigger asthma.    Wash your hands often with soap and warm water or use a hand  containing alcohol.    Get a yearly flu shot. And ask if you should get a pneumonia vaccine.    Take care of your general health. Get plenty of sleep. And eat a variety of healthy foods.      Food additives. Food additives can trigger asthma flare-ups in some people.    Check food labels for sulfites or other similar ingredients. These are often found in foods such as wine, beer, and dried fruits.    Avoid foods that contain these additives.      Medicine. Aspirin, NSAIDS like ibuprofen and naproxen, and heart medicines like beta-blockers may be triggers.    Tell your health care provider if you think certain medicines trigger symptoms.     Be sure to read the labels on over-the-counter medicines. They may have ingredients that cause symptoms for you.     , Emotions. Laughing, crying, or feeling excited are triggers for some people.     To help you stay calm: Try breathing in slowly through your nose for a count of 2 seconds. Close your lips and breathe out for 4 seconds. Repeat.    Try to focus on a soothing image in your mind. This will help relax you and calm your breathing.    Remember to take your daily controller medicines. When you re upset or under stress, it s easy to forget.      Exercise. For some people, exercise can trigger symptoms. Don t let this keep you from being active.     If you have not been exercising regularly, start slow and work up gradually.    Take all of your medicines as prescribed.    If you use quick-relief medicine, make sure you have it with you when you exercise.    Stop if you have any  symptoms. Make sure you talk with your provider about these symptoms.  Date Last Reviewed: 1/1/2017 2000-2017 [employer]. [address] [place] [address], [place] [address]. All rights reserved. This information is not intended as a substitute for professional medical care. Always follow your healthcare professional's instructions.        Asthma (Adult)  Asthma is a disease where the medium and  small air passages within the lung go into spasm and restrict the flow of air. Inflammation and swelling of the airways cause further blockage. During an acute asthma attack, these factors cause trouble breathing, wheezing, cough and chest tightness.    An asthma attack can be triggered by many things. Common triggers include infections such as the common cold, bronchitis, and pneumonia. Irritants such as smoke or pollutants in the air, very cold air, emotional upset, and exercise can also trigger an attack. In many adults with asthma, allergies to dust, mold, pollen and animal dander can cause an asthma attack. Skipping doses of daily asthma medicine can also bring on an asthma attack.  Asthma can be controlled using the proper medicines prescribed by your healthcare provider and avoiding exposure to known triggers including allergens and irritants.  Home care    Take prescribed medicine exactly at the times advised. If you need medicine such as from a hand held inhaler or aerosol breathing machine more than every 4 hours, contact your healthcare provider or seek immediate medical attention. If prescribed an antibiotic or prednisone, take all of the medicine as prescribed, even if you are feeling better after a few days.    Don't smoke. Avoid being exposed to the smoke of others.    Some people with asthma have worsening of their symptoms when they take aspirin and non-steroidal or fever-reducing medicines like ibuprofen and naproxen. Talk to your healthcare provider if you think this may apply to you.  Follow-up  "care  Follow up with your healthcare provider, or as advised. Always bring all of your current medicines to any appointments with your healthcare provider. Also bring a complete list of medicines even those not taken for asthma. If you don't already have one, talk to your healthcare provider about developing your own \"Asthma Action Plan.\"  A pneumococcal (pneumonia) vaccine and yearly flu shot (every fall) are recommended. Ask your doctor about this.  When to seek medical advice  Call your healthcare provider right away if any of these occur:     Increased wheezing or shortness of breath    Need to use your inhalers more often than usual without relief    Fever of 100.4 F (38 C) or higher, or as directed by your healthcare provider    Coughing up lots of dark-colored or bloody sputum (mucus)    Chest pain with each breath    If you use a peak flow meter as part of an Asthma Action Plan, and you are still in the yellow zone (50% to 80%) 15 minutes after using inhaler medicine.  Call 911  Call 911 if any of the following occur    Trouble walking or talking because of shortness of breath    If you use a peak flow meter as part of an Asthma Action Plan and you are still in the red zone (less than 50%) 15 minutes after using inhaler medicine    Lips or fingernails turning gray or blue  Date Last Reviewed: 5/1/2017 2000-2017 [employer]. [address] [place] [address], [place] [address]. All rights reserved. This information is not intended as a substitute for professional medical care. Always follow your healthcare professional's instructions.        "

## 2019-01-23 ENCOUNTER — HOSPITAL ENCOUNTER (EMERGENCY)
Facility: CLINIC | Age: 29
Discharge: HOME | End: 2019-01-23
Attending: EMERGENCY MEDICINE | Admitting: EMERGENCY MEDICINE
Payer: COMMERCIAL

## 2019-01-23 VITALS
OXYGEN SATURATION: 99 % | HEIGHT: 72 IN | SYSTOLIC BLOOD PRESSURE: 130 MMHG | WEIGHT: 190 LBS | TEMPERATURE: 97.6 F | BODY MASS INDEX: 25.73 KG/M2 | DIASTOLIC BLOOD PRESSURE: 76 MMHG

## 2019-01-23 DIAGNOSIS — Z02.89 ENCOUNTER TO OBTAIN EXCUSE FROM WORK: ICD-10-CM

## 2019-01-23 PROCEDURE — 99281 EMR DPT VST MAYX REQ PHY/QHP: CPT | Mod: Z6 | Performed by: EMERGENCY MEDICINE

## 2019-01-23 PROCEDURE — 99281 EMR DPT VST MAYX REQ PHY/QHP: CPT

## 2019-01-23 ASSESSMENT — MIFFLIN-ST. JEOR: SCORE: 1859.83

## 2019-01-23 NOTE — ED AVS SNAPSHOT
[facility], [place], Emergency Department  [address] [place] [id number]  Phone:  [phone]                                    Kesha Allen   MRN: [id number]    Department:  [facility], Emergency Department   Date of Visit:  1/23/2019           After Visit Summary Signature Page    I have received my discharge instructions, and my questions have been answered. I have discussed any challenges I see with this plan with the nurse or doctor.    ..........................................................................................................................................  Patient/Patient Representative Signature      ..........................................................................................................................................  Patient Representative Print Name and Relationship to Patient    ..................................................               ................................................  Date                                   Time    ..........................................................................................................................................  Reviewed by Signature/Title    ...................................................              ..............................................  Date                                               Time          22EPIC Rev 08/18

## 2019-01-24 NOTE — ED PROVIDER NOTES
History     Chief Complaint   Patient presents with     Generalized Body Aches     HPI  Kesha Allen is a 28 year old male who presents emergency room requesting a note so that he can go back to work.  Patient states that he has been at home for the past couple of days taking care of his sick children and has also had some body aches where he has been seeing a massage therapist and a chiropractor.  He states that he thinks that he might be coming down with something that does not want a workup for it here in the emergency room his primary purpose for the emergency room visit is to get a note for his work to go back because he was taking care of his children.    I have reviewed the Medications, Allergies, Past Medical and Surgical History, and Social History in the Epic system.    Review of Systems   All other systems reviewed and are negative.      Physical Exam   BP: 130/76  Heart Rate: 71  Temp: 97.6  F (36.4  C)  Height: 182.9 cm (6')  Weight: 86.2 kg (190 lb)  SpO2: 99 %      Physical Exam   Constitutional: He is oriented to person, place, and time. He appears well-developed and well-nourished. No distress.   HENT:   Head: Normocephalic and atraumatic.   Eyes: No scleral icterus.   Neck: Normal range of motion. Neck supple.   Cardiovascular: Normal rate.   Pulmonary/Chest: Effort normal.   Neurological: He is alert and oriented to person, place, and time.   Skin: Skin is warm and dry. No rash noted. He is not diaphoretic.       ED Course        Procedures             Critical Care time:  none             Labs Ordered and Resulted from Time of ED Arrival Up to the Time of Departure from the ED - No data to display         Assessments & Plan (with Medical Decision Making)   This is a 28-year-old male coming into the emergency room requesting a return to work note.  He has been at home taking care of his sick children is coming into the emergency room requesting a note that will allow him to go back to work.  As  his principal reason for coming to the emergency room.  He thinks that he might be coming down with what his children have but he does not want to be worked up for that and has not the reason for coming to the emergency room.  He was expressed to the patient that we do not write notes from the emergency room to allow him to go back to work.  He can talk to his children his primary care provider or his own.  Patient understands and agrees with my explanation.    I have reviewed the nursing notes.    I have reviewed the findings, diagnosis, plan and need for follow up with the patient.       Medication List      There are no discharge medications for this visit.         Final diagnoses:   Encounter to obtain excuse from work       1/23/2019   [facility], [place], EMERGENCY DEPARTMENT     Toni Hardin MD  01/24/19 0108

## 2019-01-24 NOTE — ED TRIAGE NOTES
"Pt c/o body aches and sore throat. States he has had body aches for \"a long time\" and has been going to the massage therapist, who recommended a chiropractor d/t a pinched nerve. Pt states his work made him come in to get a note so he can go back to work.   "

## 2019-01-24 NOTE — DISCHARGE INSTRUCTIONS
Please make an appointment to follow up with [facility] (phone: [phone]) as soon as possible.  You can also try going to an Occupational work clinic.

## 2019-11-18 ENCOUNTER — DOCUMENTATION ONLY (OUTPATIENT)
Dept: CARE COORDINATION | Facility: CLINIC | Age: 29
End: 2019-11-18

## 2019-11-19 ENCOUNTER — OFFICE VISIT (OUTPATIENT)
Dept: OPHTHALMOLOGY | Facility: CLINIC | Age: 29
End: 2019-11-19
Payer: COMMERCIAL

## 2019-11-19 DIAGNOSIS — H02.88A MEIBOMIAN GLAND DYSFUNCTION (MGD), BILATERAL, BOTH UPPER AND LOWER LIDS: ICD-10-CM

## 2019-11-19 DIAGNOSIS — H00.11 CHALAZION OF RIGHT UPPER EYELID: Primary | ICD-10-CM

## 2019-11-19 DIAGNOSIS — H00.14 CHALAZION OF LEFT UPPER EYELID: ICD-10-CM

## 2019-11-19 DIAGNOSIS — H02.88B MEIBOMIAN GLAND DYSFUNCTION (MGD), BILATERAL, BOTH UPPER AND LOWER LIDS: ICD-10-CM

## 2019-11-19 ASSESSMENT — TONOMETRY
OD_IOP_MMHG: 13
OS_IOP_MMHG: 11
IOP_METHOD: ICARE

## 2019-11-19 ASSESSMENT — PATIENT HEALTH QUESTIONNAIRE - PHQ9
SUM OF ALL RESPONSES TO PHQ QUESTIONS 1-9: 15
10. IF YOU CHECKED OFF ANY PROBLEMS, HOW DIFFICULT HAVE THESE PROBLEMS MADE IT FOR YOU TO DO YOUR WORK, TAKE CARE OF THINGS AT HOME, OR GET ALONG WITH OTHER PEOPLE: VERY DIFFICULT
SUM OF ALL RESPONSES TO PHQ QUESTIONS 1-9: 15

## 2019-11-19 ASSESSMENT — VISUAL ACUITY
OS_SC: 20/20
OD_SC: 20/20
OS_SC+: -3
METHOD: SNELLEN - LINEAR

## 2019-11-19 ASSESSMENT — CONF VISUAL FIELD
OD_NORMAL: 1
OS_NORMAL: 1
METHOD: COUNTING FINGERS

## 2019-11-19 ASSESSMENT — EXTERNAL EXAM - LEFT EYE: OS_EXAM: NORMAL

## 2019-11-19 ASSESSMENT — EXTERNAL EXAM - RIGHT EYE: OD_EXAM: NORMAL

## 2019-11-19 NOTE — NURSING NOTE
Chief Complaints and History of Present Illnesses   Patient presents with     Annual Eye Exam     Chief Complaint(s) and History of Present Illness(es)     Annual Eye Exam     Laterality: both eyes    Onset: gradual    Onset: months ago    Quality: blurred vision    Severity: mild    Frequency: constantly    Timing: throughout the day    Course: stable    Associated symptoms: eye pain    Treatments tried: no treatments    Pain scale: 5/10              Comments     Bump on upper lids for several months, tried warm compresses but did not help. ED told pt it could be swollen glands. Right one is painful but left is not. No discharge.     When looking far to the sides difficult to focus, fluctuation on distance vision. Not on any eye drops.    Diana Zhu COT 1:36 PM November 19, 2019

## 2019-11-19 NOTE — PROGRESS NOTES
History  HPI     Annual Eye Exam     In both eyes.  Onset was gradual.  This started months ago.  Charactertized as  blurred vision.  Severity is mild.  Occurring constantly.  It is worse throughout the day.  Since onset it is stable.  Associated symptoms include eye pain.  Treatments tried include no treatments and warm compresses.  Response to treatment was no improvement.  Pain was noted as 5/10.              Comments     Bump on upper lids for several months, tried warm compresses but did not help. ED told pt it could be swollen glands. Right one is painful but left is not. No discharge.     When looking far to the sides difficult to focus, fluctuation on distance vision. Not on any eye drops.    Diana Zhu COT 1:36 PM November 19, 2019             Last edited by Diana Zhu on 11/19/2019  1:38 PM. (History)          Assessment/Plan  (H00.11) Chalazion of right upper eyelid  (primary encounter diagnosis)  Comment: Cosmetically significant, patient reports intermittent pain, patient attempted warm compresses for months without improvement  Plan:  Educated patient on condition and findings. Educated on either continuing treatment with warm compresses, steroid injections, or surgery. Patient requests for surgical option. Referred to Dr. Chaves for chalazion excision for both eyes.    (H00.14) Chalazion of left upper eyelid  Plan: See above    (H02.88A,  H02.88B) Meibomian gland dysfunction (MGD), bilateral, both upper and lower lids  Comment: Symptomatic, capped glands in both eyes  Plan:  Continue with warm compresses daily for 10-15 minutes. Monitor as needed.    Uzair Arguello, Optometry Student    I have reviewed and agree with the above plan as written.    Teaching Statement:    Complete documentation of historical and exam elements from today's encounter can  be found in the full encounter summary report (not reduplicated in this progress  note). I personally obtained the chief complaint(s) and history  of present illness. I  confirmed and edited as necessary the review of systems, past medical/surgical  history, family history, social history, and examination findings as documented by  others; and I examined the patient myself. I personally reviewed the relevant tests,  images, and reports as documented above. I formulated and edited as necessary the  assessment and plan and discussed the findings and management plan with the  patient and family.    Woodrow Carrillo OD, FAAO

## 2019-11-20 ASSESSMENT — PATIENT HEALTH QUESTIONNAIRE - PHQ9: SUM OF ALL RESPONSES TO PHQ QUESTIONS 1-9: 15

## 2019-11-20 NOTE — TELEPHONE ENCOUNTER
FUTURE VISIT INFORMATION      FUTURE VISIT INFORMATION:    Date: 11/21/19    Time: 11:30am    [place] [facility] [place]  REFERRAL INFORMATION:    Referring provider:  Woodrow Thomason    Referring providers clinic:  [facility]    Reason for visit/diagnosis  Chalazion of right upper eyelid      RECORDS REQUESTED FROM:       Clinic name Comments Records Status Imaging Status   [facility] OV/referral 11/19/20Veronica WILCOX

## 2019-11-21 ENCOUNTER — OFFICE VISIT (OUTPATIENT)
Dept: OPHTHALMOLOGY | Facility: CLINIC | Age: 29
End: 2019-11-21
Payer: COMMERCIAL

## 2019-11-21 ENCOUNTER — PRE VISIT (OUTPATIENT)
Dept: OPHTHALMOLOGY | Facility: CLINIC | Age: 29
End: 2019-11-21

## 2019-11-21 DIAGNOSIS — H00.14 CHALAZION OF LEFT UPPER EYELID: ICD-10-CM

## 2019-11-21 DIAGNOSIS — H00.11 CHALAZION OF RIGHT UPPER EYELID: Primary | ICD-10-CM

## 2019-11-21 RX ORDER — ERYTHROMYCIN 5 MG/G
OINTMENT OPHTHALMIC ONCE
Status: COMPLETED | OUTPATIENT
Start: 2019-11-21 | End: 2019-11-21

## 2019-11-21 RX ADMIN — ERYTHROMYCIN: 5 OINTMENT OPHTHALMIC at 12:07

## 2019-11-21 ASSESSMENT — VISUAL ACUITY
OD_SC: 20/20
OS_SC: 20/25
METHOD: SNELLEN - LINEAR

## 2019-11-21 ASSESSMENT — CONF VISUAL FIELD
OD_NORMAL: 1
OS_NORMAL: 1

## 2019-11-21 ASSESSMENT — EXTERNAL EXAM - LEFT EYE: OS_EXAM: NORMAL

## 2019-11-21 ASSESSMENT — EXTERNAL EXAM - RIGHT EYE: OD_EXAM: NORMAL

## 2019-11-21 NOTE — NURSING NOTE
Chief Complaints and History of Present Illnesses   Patient presents with     Chalazion Evaluation     Chief Complaint(s) and History of Present Illness(es)     Chalazion Evaluation     Laterality: left upper lid and right upper lid    Onset: 2 months ago    Course: gradually worsening    Response to treatment: no improvement    Pain scale: 0/10              Comments     Chalazion in the last 2 months.     HALLIE Ford COT 11:45 AM November 21, 2019

## 2019-11-21 NOTE — PROGRESS NOTES
"     Chief Complaint(s) and History of Present Illness(es)     Chalazion Evaluation     Laterality: left upper lid and right upper lid    Onset: 2 months ago    Course: gradually worsening    Response to treatment: no improvement    Pain scale: 0/10           Comments     Chalazion in the last 2 months.     HALLIE Ford COT 11:45 AM November 21, 2019      Has tried \"everything you can think of,\" tried warm compresses, and antibiotic ? Ointment. It is moderately painful. Both upper eyelids.       Assessment & Plan     Kesha Allen is a 28 year old male with the following diagnoses:   1. Chalazion       Discussed options.  Plan: Bilateral upper lids incision and drainage of chalazion. Would like done both today.        Attending Physician Attestation:  Complete documentation of historical and exam elements from today's encounter can be found in the full encounter summary report (not reduplicated in this progress note).  I personally obtained the chief complaint(s) and history of present illness.  I confirmed and edited as necessary the review of systems, past medical/surgical history, family history, social history, and examination findings as documented by others; and I examined the patient myself.  I personally reviewed the relevant tests, images, and reports as documented above.  I formulated and edited as necessary the assessment and plan and discussed the findings and management plan with the patient and family. - Jhonny Chaves MD      OPERATIVE NOTE: CHALAZION.    PRE-OPERATIVE DIAGNOSIS: Chalazion right upper lid and left upper lid.    POST-OPERATIVE DIAGNOSIS: Chalazion right upper lid and left upper lid.    PROCEDURE PERFORMED: Incision and drainage of chalazion right upper lid and left upper lid.    SURGEON: Jhonny Chaves    ASSISTANT: Junior Casey MD     ANESTHESIA: Local infiltration with 2% lidocaine with epinephrine.    COMPLICATIONS: None    ESTIMATED BLOOD LOSS:  <5mL    HISTORY AND " INDICATIONS: Patient presented with an enlarging chalazion in the involved eyelid.  This failed conservative medical management.  After the risks, benefits and alternatives of the proposed procedure were explained, informed consent was obtained.     PROCEDURE: Patient was brought to the minor procedure room and placed supine on the operating table.  Anesthesia was as listed above.  The area was prepped and draped in the typical fashion.  A chalazion clamp was placed over the involved eyelid and the eyelid everted.  A crutiate incision was made over the chalazion and the lipogranulomatous material removed using the chalazion curette.  Scissors were used to break any septae.  The edges of the cruciate incision were excised using Willis scissors.  Hemostasis was obtained.  The lid was reverted to its normal position, the clamp removed, and the erythromycin antibiotic ointment applied.  The patient tolerated the procedure well and left in stable condition with a tube of antibiotic ointment.     I was present for the entire procedure. Jhonny Chaves MD

## 2019-11-21 NOTE — LETTER
" 2019         RE:  :  MRN: Kesha Allen  1990  [id number]     Dear Dr. Carrillo,    Thank you for asking me to see your patient, Kesha Allen, for an oculoplastic   consultation.  My assessment and plan are below.  For further details, please see my attached clinic note.      Chief Complaint(s) and History of Present Illness(es)     Chalazion Evaluation     Laterality: left upper lid and right upper lid    Onset: 2 months ago    Course: gradually worsening    Response to treatment: no improvement    Pain scale: 0/10           Comments     Chalazion in the last 2 months.     Aayzarnold Arguello, COT COT 11:45 AM 2019      Has tried \"everything you can think of,\" tried warm compresses, and antibiotic ? Ointment. It is moderately painful. Both upper eyelids.       Assessment & Plan     Kesha Allen is a 28 year old male with the following diagnoses:   1. Chalazion       Discussed options.  Plan: Bilateral upper lids incision and drainage of chalazion. Would like done both today.       Again, thank you for allowing me to participate in the care of your patient.      Sincerely,    Jhonny Chaves MD  Department of Ophthalmology and Visual Neurosciences  [facility]    CC: Woodrow Carrillo, OD  [address]  [address] [facility] [id number]  VIA In Basket     "

## 2021-02-02 ENCOUNTER — OFFICE VISIT (OUTPATIENT)
Dept: OPHTHALMOLOGY | Facility: CLINIC | Age: 31
End: 2021-02-02
Payer: COMMERCIAL

## 2021-02-02 DIAGNOSIS — H00.13 CHALAZION, BILATERAL: Primary | ICD-10-CM

## 2021-02-02 DIAGNOSIS — H00.16 CHALAZION, BILATERAL: Primary | ICD-10-CM

## 2021-02-02 PROCEDURE — 99207 PR DROP WITH A PROCEDURE: CPT | Performed by: OPHTHALMOLOGY

## 2021-02-02 PROCEDURE — 67800 REMOVE EYELID LESION: CPT | Mod: E1 | Performed by: OPHTHALMOLOGY

## 2021-02-02 RX ORDER — ERYTHROMYCIN 5 MG/G
OINTMENT OPHTHALMIC ONCE
Status: COMPLETED | OUTPATIENT
Start: 2021-02-02 | End: 2021-02-02

## 2021-02-02 RX ADMIN — ERYTHROMYCIN: 5 OINTMENT OPHTHALMIC at 10:56

## 2021-02-02 ASSESSMENT — CONF VISUAL FIELD
OS_NORMAL: 1
METHOD: COUNTING FINGERS
OD_NORMAL: 1

## 2021-02-02 ASSESSMENT — TONOMETRY
IOP_METHOD: ICARE
OS_IOP_MMHG: 21
OD_IOP_MMHG: 19

## 2021-02-02 ASSESSMENT — VISUAL ACUITY
OS_SC+: +2
METHOD: SNELLEN - LINEAR
OD_SC: 20/20
OD_SC+: -3
OS_SC: 20/25

## 2021-02-02 NOTE — PROGRESS NOTES
Chief Complaint(s) and History of Present Illness(es)     Follow Up     Associated symptoms: eye pain, redness and dryness.  Negative for tearing      Pain scale: 10/10    Comments: Chalazion              Comments     Had I and R in Nov. Of 2019 and Chalazion's resolved. However exactly a   year later in Nov 2020 Chalazion's seemed to return. Bother upper lids   affected and seem like the Chalazion's are getting greater in size. Both   upper lids are painful. No sure that there is discharge from either but   has noticed more mattering upon waking the past year. Vision seem to be   affected as vision each eye is blurry. left eye is more painful than right   eye. Painful with eye movement. Compress treatments BID daily for the past   month.   Would like to have them removed today if possible.     HALLIE Gonzalez COT 9:40 AM February 2, 2021       Assessment & Plan     Kesha Allen is a 30 year old male with the following diagnoses:   Encounter Diagnosis   Name Primary?     Chalazion, bilateral Yes     History of chalazion excision in 2019, now has had bilateral upper lid chalazia for past few weeks, not resolving with conservative therapy and quite large and tender.  Here for excision today, R/B/A discussed.  Preventive methods also discussed    Plan:  - bilateral upper lid chalazion excision today  - warm compresses, lid hygiene, fish oil, artificial tears  - rtc prn      Attending Physician Attestation: Complete documentation of historical and exam elements from today's encounter can be found in the full encounter summary report (not reduplicated in this progress note). I personally obtained the chief complaint(s) and history of present illness. I confirmed and edited as necessary the review of systems, past medical/surgical history, family history, social history, and examination findings as documented by others; and I examined the patient myself. I personally reviewed the relevant tests, images, and  reports as documented above. I formulated and edited as necessary the assessment and plan and discussed the findings and management plan with the patient.  -Jhonny Chaves MD    OPERATIVE NOTE: CHALAZION.    PRE-OPERATIVE DIAGNOSIS: Chalazion right upper lid and left upper lid.    POST-OPERATIVE DIAGNOSIS: Chalazion right upper lid and left upper lid.    PROCEDURE PERFORMED: Incision and drainage of chalazion right upper lid and left upper lid.    SURGEON: Jhonny Chaves    ASSISTANT: Wilman Kim MD     ANESTHESIA: Local infiltration with 2% lidocaine with epinephrine.    COMPLICATIONS: None    ESTIMATED BLOOD LOSS:  <5mL    HISTORY AND INDICATIONS: Patient presented with an enlarging chalazion in the involved eyelid.  This failed conservative medical management.  After the risks, benefits and alternatives of the proposed procedure were explained, informed consent was obtained.     PROCEDURE: Patient was brought to the minor procedure room and placed supine on the operating table.  Anesthesia was as listed above.  The area was prepped and draped in the typical fashion.  A chalazion clamp was placed over the right upper eyelid and the eyelid everted.  A crutiate incision was made over the chalazion and the lipogranulomatous material removed using the chalazion curette.  Scissors were used to break any septae.  The edges of the cruciate incision were excised using Willis scissors.  Hemostasis was obtained.  The lid was reverted to its normal position, the clamp removed, and the erythromycin antibiotic ointment applied. The same was performed on the left upper eyelid. The patient tolerated the procedure well and left in stable condition with a tube of antibiotic ointment.     I was present for the entire procedure. Jhonny Chaves MD

## 2021-02-02 NOTE — NURSING NOTE
Chief Complaints and History of Present Illnesses   Patient presents with     Follow Up     Chalazion     Chief Complaint(s) and History of Present Illness(es)     Follow Up     Associated symptoms: eye pain, redness and dryness.  Negative for tearing    Pain scale: 10/10    Comments: Chalazion              Comments     Had I and R in Nov. Of 2019 and Chalazion's resolved. However exactly a year later in Nov 2020 Chalazion's seemed to return. Bother upper lids affected and seem like the Chalazion's are getting greater in size. Both upper lids are painful. No sure that there is discharge from either but has noticed more mattering upon waking the past year. Vision seem to be affected as vision each eye is blurry. left eye is more painful than right eye. Painful with eye movement. Compress treatments BID daily for the past month.   Would like to have them removed today if possible.     HALLIE Gonzalez COT 9:40 AM February 2, 2021

## 2021-03-23 ENCOUNTER — OFFICE VISIT (OUTPATIENT)
Dept: OPHTHALMOLOGY | Facility: CLINIC | Age: 31
End: 2021-03-23
Payer: COMMERCIAL

## 2021-03-23 DIAGNOSIS — H00.14 CHALAZION OF LEFT UPPER EYELID: Primary | ICD-10-CM

## 2021-03-23 PROCEDURE — 67800 REMOVE EYELID LESION: CPT | Mod: E1 | Performed by: OPHTHALMOLOGY

## 2021-03-23 PROCEDURE — 99207 PR BUNDLED PROCEDURE IN GLOBAL PKG: CPT | Performed by: OPHTHALMOLOGY

## 2021-03-23 RX ORDER — ERYTHROMYCIN 5 MG/G
OINTMENT OPHTHALMIC ONCE
Status: COMPLETED | OUTPATIENT
Start: 2021-03-23 | End: 2021-03-23

## 2021-03-23 RX ORDER — LIDOCAINE HYDROCHLORIDE AND EPINEPHRINE 10; 10 MG/ML; UG/ML
1 INJECTION, SOLUTION INFILTRATION; PERINEURAL ONCE
Status: COMPLETED | OUTPATIENT
Start: 2021-03-23 | End: 2021-03-23

## 2021-03-23 RX ORDER — NEOMYCIN POLYMYXIN B SULFATES AND DEXAMETHASONE 3.5; 10000; 1 MG/ML; [USP'U]/ML; MG/ML
SUSPENSION/ DROPS OPHTHALMIC
Qty: 5 ML | Refills: 0 | Status: SHIPPED | OUTPATIENT
Start: 2021-03-23

## 2021-03-23 RX ADMIN — LIDOCAINE HYDROCHLORIDE AND EPINEPHRINE 1 ML: 10; 10 INJECTION, SOLUTION INFILTRATION; PERINEURAL at 09:52

## 2021-03-23 RX ADMIN — ERYTHROMYCIN: 5 OINTMENT OPHTHALMIC at 09:51

## 2021-03-23 ASSESSMENT — CONF VISUAL FIELD
OS_NORMAL: 1
OD_NORMAL: 1
METHOD: COUNTING FINGERS

## 2021-03-23 ASSESSMENT — VISUAL ACUITY
OS_SC+: +2
OD_SC+: -2
OD_SC: 20/20
METHOD: SNELLEN - LINEAR
OS_SC: 20/25

## 2021-03-23 ASSESSMENT — TONOMETRY
OD_IOP_MMHG: 19
OS_IOP_MMHG: 20
IOP_METHOD: ICARE

## 2021-03-23 NOTE — NURSING NOTE
Chief Complaints and History of Present Illnesses   Patient presents with     Bump On Left Upper Lid     Chief Complaint(s) and History of Present Illness(es)     Bump On Left Upper Lid     Laterality: left upper lid    Course: stable    Associated symptoms: lid pain and lid swelling.  Negative for discharge and tearing    Pain scale: 7/10              Comments     Pt notes bump on SUZY never went away after last visit.  Complains of SUZY being painful.  Denies any discharge and tearing.  Notes he has been doing warm and cool compresses - has minimal relief.  Ocular meds: None    Mary Ball OT 8:49 AM March 23, 2021

## 2021-03-23 NOTE — PROGRESS NOTES
Chief Complaint(s) and History of Present Illness(es)     Bump On Left Upper Lid     Laterality: left upper lid    Course: stable    Associated symptoms: lid pain and lid swelling.  Negative for discharge   and tearing    Pain scale: 7/10              Comments     Pt notes bump on SUZY never went away after last visit.  Complains of SUZY being painful.  Denies any discharge and tearing.  Notes he has been doing warm and cool compresses - has minimal relief.  Ocular meds: None    Maryjacey Ball OT 8:49 AM March 23, 2021                   Assessment & Plan     Kesha Allen is a 30 year old male with the following diagnoses:   Encounter Diagnosis   Name Primary?     Chalazion of left upper eyelid Yes     Recurrent left upper eyelid chalazion, he would like excision again today.    Plan:  - excision in minor room  - start maxitrol gtt tid left eye for 2 weeks  - continue warm compresses, lid hygiene  - RTC prn    Wilman Kim MD/MPH  Oculoplastic Surgery Fellow  3/23/2021 at 9:03 AM        Attending Physician Attestation: Complete documentation of historical and exam elements from today's encounter can be found in the full encounter summary report (not reduplicated in this progress note). I personally obtained the chief complaint(s) and history of present illness. I confirmed and edited as necessary the review of systems, past medical/surgical history, family history, social history, and examination findings as documented by others; and I examined the patient myself. I personally reviewed the relevant tests, images, and reports as documented above. I formulated and edited as necessary the assessment and plan and discussed the findings and management plan with the patient.  -Jhonny Chaves MD    OPERATIVE NOTE: CHALAZION.    PRE-OPERATIVE DIAGNOSIS: Chalazion left upper lid.    POST-OPERATIVE DIAGNOSIS: Chalazion left upper lid.    PROCEDURE PERFORMED: Incision and drainage of chalazion left upper  lid.    SURGEON: Jhonny Chaves    ASSISTANT: Wilman Kim MD    ANESTHESIA: Local infiltration with 2% lidocaine with epinephrine.    COMPLICATIONS: None    ESTIMATED BLOOD LOSS:  <5mL    HISTORY AND INDICATIONS: Patient presented with an enlarging chalazion in the involved eyelid.  This failed conservative medical management.  After the risks, benefits and alternatives of the proposed procedure were explained, informed consent was obtained.     PROCEDURE: Patient was brought to the minor procedure room and placed supine on the operating table.  Anesthesia was as listed above.  The area was prepped and draped in the typical fashion.  A chalazion clamp was placed over the involved eyelid and the eyelid everted.  A crutiate incision was made over the chalazion and the lipogranulomatous material removed using the chalazion curette.  Scissors were used to break any septae.  The edges of the cruciate incision were excised using Willis scissors.  Hemostasis was obtained.  The lid was reverted to its normal position, the clamp removed, and the erythromycin antibiotic ointment applied.  The patient tolerated the procedure well and left in stable condition with a tube of antibiotic ointment.     I was present for the entire procedure. Jhonny Chaves MD

## 2021-04-15 ENCOUNTER — OFFICE VISIT (OUTPATIENT)
Dept: OPHTHALMOLOGY | Facility: CLINIC | Age: 31
End: 2021-04-15
Payer: COMMERCIAL

## 2021-04-15 VITALS — WEIGHT: 180 LBS | HEIGHT: 72 IN | BODY MASS INDEX: 24.38 KG/M2

## 2021-04-15 DIAGNOSIS — H00.024 HORDEOLUM INTERNUM OF LEFT UPPER EYELID: Primary | ICD-10-CM

## 2021-04-15 PROCEDURE — 99213 OFFICE O/P EST LOW 20 MIN: CPT | Performed by: OPTOMETRIST

## 2021-04-15 RX ORDER — CEPHALEXIN 500 MG/1
500 CAPSULE ORAL 3 TIMES DAILY
Qty: 30 CAPSULE | Refills: 0 | Status: SHIPPED | OUTPATIENT
Start: 2021-04-15 | End: 2021-04-25

## 2021-04-15 ASSESSMENT — TONOMETRY
IOP_METHOD: TONOPEN
OS_IOP_MMHG: 19
OD_IOP_MMHG: 19

## 2021-04-15 ASSESSMENT — CONF VISUAL FIELD
OS_NORMAL: 1
METHOD: COUNTING FINGERS
OD_NORMAL: 1

## 2021-04-15 ASSESSMENT — VISUAL ACUITY
OS_SC+: -3
METHOD: SNELLEN - LINEAR
OD_SC: 20/20
OS_SC: 20/20

## 2021-04-15 ASSESSMENT — MIFFLIN-ST. JEOR: SCORE: 1804.47

## 2021-04-15 ASSESSMENT — PATIENT HEALTH QUESTIONNAIRE - PHQ9: SUM OF ALL RESPONSES TO PHQ QUESTIONS 1-9: 12

## 2021-04-15 NOTE — PROGRESS NOTES
History  HPI     Follow Up     In left eye. Additional comments: Chalazion left upper lid.                 Comments     Patient reports that after I and D on 3/23/21 chalazion went down but did not resolve all the way. It has since started to become greater in size again. Not having any drainage noticed but wakes at times with yellow crusty discharge at times. Bump is starting to become painful again. Compress treatment done 5+ time a day.   Billy/Poly/ Dex drops TID left eye this am used. Feel like vision gets blurry with left eye when chalazion is bothering.     HALLIE Gonzalez COT 9:15 AM April 15, 2021              Last edited by Mary Alice Andrews COT on 4/15/2021  9:15 AM. (History)          Assessment/Plan  (H00.024) Hordeolum internum of left upper eyelid  (primary encounter diagnosis)  Comment: Internal hordeolum left upper lid, recurrent, previously drained  Plan: cephALEXin (KEFLEX) 500 MG capsule         Educated patient on condition and clinical findings. Recommended warm compresses 3-4 times each day for ten minutes. Follow-up with Dr. Kim as scheduled for oculoplastics consultation.  Prescribed 500mg Keflex three times each day for 10 days given size of hordeolum and no improvement with warm compresses.    Complete documentation of historical and exam elements from today's encounter can  be found in the full encounter summary report (not reduplicated in this progress  note). I personally obtained the chief complaint(s) and history of present illness. I  confirmed and edited as necessary the review of systems, past medical/surgical  history, family history, social history, and examination findings as documented by  others; and I examined the patient myself. I personally reviewed the relevant tests,  images, and reports as documented above. I formulated and edited as necessary the  assessment and plan and discussed the findings and management plan with the  patient and family.    Woodrow URRUTIA  MASOUD Carrillo, [facility]

## 2021-04-15 NOTE — NURSING NOTE
Chief Complaints and History of Present Illnesses   Patient presents with     Follow Up     Chalazion left upper lid.        Chief Complaint(s) and History of Present Illness(es)     Follow Up     Laterality: left eye    Comments: Chalazion left upper lid.                 Comments     Patient reports that after I and D on 3/23/21 chalazion went down but did not resolve all the way. It has since started to become greater in size again. Not having any drainage noticed but wakes at times with yellow crusty discharge at times. Bump is starting to become painful again. Compress treatment done 5+ time a day.   Billy/Poly/ Dex drops TID left eye this am used. Feel like vision gets blurry with left eye when chalazion is bothering.     Mary Alice Andrews, COT COT 9:15 AM April 15, 2021

## 2021-04-27 ENCOUNTER — OFFICE VISIT (OUTPATIENT)
Dept: OPHTHALMOLOGY | Facility: CLINIC | Age: 31
End: 2021-04-27
Payer: COMMERCIAL

## 2021-04-27 DIAGNOSIS — H00.14 CHALAZION OF LEFT UPPER EYELID: Primary | ICD-10-CM

## 2021-04-27 PROCEDURE — 99213 OFFICE O/P EST LOW 20 MIN: CPT | Mod: 25 | Performed by: OPHTHALMOLOGY

## 2021-04-27 PROCEDURE — 67800 REMOVE EYELID LESION: CPT | Mod: E1 | Performed by: OPHTHALMOLOGY

## 2021-04-27 RX ORDER — LIDOCAINE HYDROCHLORIDE AND EPINEPHRINE 10; 10 MG/ML; UG/ML
1 INJECTION, SOLUTION INFILTRATION; PERINEURAL ONCE
Status: COMPLETED | OUTPATIENT
Start: 2021-04-27 | End: 2021-04-27

## 2021-04-27 RX ORDER — ERYTHROMYCIN 5 MG/G
OINTMENT OPHTHALMIC ONCE
Status: COMPLETED | OUTPATIENT
Start: 2021-04-27 | End: 2021-04-27

## 2021-04-27 RX ORDER — DEXAMETHASONE SODIUM PHOSPHATE 4 MG/ML
4 INJECTION, SOLUTION INTRA-ARTICULAR; INTRALESIONAL; INTRAMUSCULAR; INTRAVENOUS; SOFT TISSUE ONCE
Status: COMPLETED | OUTPATIENT
Start: 2021-04-27 | End: 2021-04-27

## 2021-04-27 RX ADMIN — LIDOCAINE HYDROCHLORIDE AND EPINEPHRINE 1 ML: 10; 10 INJECTION, SOLUTION INFILTRATION; PERINEURAL at 08:43

## 2021-04-27 RX ADMIN — DEXAMETHASONE SODIUM PHOSPHATE 4 MG: 4 INJECTION, SOLUTION INTRA-ARTICULAR; INTRALESIONAL; INTRAMUSCULAR; INTRAVENOUS; SOFT TISSUE at 08:39

## 2021-04-27 RX ADMIN — ERYTHROMYCIN: 5 OINTMENT OPHTHALMIC at 08:41

## 2021-04-27 ASSESSMENT — TONOMETRY
IOP_METHOD: ICARE
OD_IOP_MMHG: 18
OS_IOP_MMHG: 18

## 2021-04-27 ASSESSMENT — VISUAL ACUITY
OS_SC: 20/30
OD_SC: 20/20
OS_SC+: -1+1
METHOD: SNELLEN - LINEAR

## 2021-04-27 ASSESSMENT — CONF VISUAL FIELD
OS_NORMAL: 1
OD_NORMAL: 1
METHOD: COUNTING FINGERS

## 2021-04-27 NOTE — PROGRESS NOTES
"     Chief Complaint(s) and History of Present Illness(es)     Hordeolum Evaluation     Laterality: left upper lid    Course: gradually improving    Associated symptoms: Negative for lid swelling, lid pain, lid redness,   eye pain and bleeding    Pain scale: 0/10          Comments     Kesha Allen is being seen for a consult today by the request of Dr. Carrillo for hordeolum of left upper eyelid.  Patient notes that he hasn't   been taking the keflex regularly, took one yesterday, out of the drops   (milagros poly dex), was doing WC more often before, not as much now, he notes   after last visit with dr. Carrillo the bump \"busted\" and he is not having   any lid tenderness or pain anymore, and notes it is still present but   smaller. It has been present for \"too long\"     Debbie Domínguez HALLIE April 27, 2021 8:02 AM           Assessment & Plan     Kesha Allen is a 30 year old male with the following diagnoses:   Encounter Diagnosis   Name Primary?     Chalazion of left upper eyelid Yes     Failed to resolve with prior incision and drainage along with warm compersses and oral and topcial antibiotics.    Discussed options. Plan repeat left upper eyelid repeat incision and drainage plus decadron injection.     Patient disposition:   No follow-ups on file.   F/u as needed      Attending Physician Attestation: Complete documentation of historical and exam elements from today's encounter can be found in the full encounter summary report (not reduplicated in this progress note). I personally obtained the chief complaint(s) and history of present illness. I confirmed and edited as necessary the review of systems, past medical/surgical history, family history, social history, and examination findings as documented by others; and I examined the patient myself. I personally reviewed the relevant tests, images, and reports as documented above. I formulated and edited as necessary the assessment and plan and discussed the findings and " management plan with the patient.  -Jhonny Chaves MD    OPERATIVE NOTE: CHALAZION.    PRE-OPERATIVE DIAGNOSIS: Chalazion left upper lid.    POST-OPERATIVE DIAGNOSIS: Chalazion left upper lid.    PROCEDURE PERFORMED: Incision and drainage of chalazion left upper lid.    SURGEON: Jhonny Chaves    ANESTHESIA: Local infiltration with 2% lidocaine with epinephrine.    COMPLICATIONS: None    ESTIMATED BLOOD LOSS:  <5mL    HISTORY AND INDICATIONS: Patient presented with an enlarging chalazion in the involved eyelid.  This failed conservative medical management.  After the risks, benefits and alternatives of the proposed procedure were explained, informed consent was obtained.     PROCEDURE: Patient was brought to the minor procedure room and placed supine on the operating table.  Anesthesia was as listed above.  The area was prepped and draped in the typical fashion.  A chalazion clamp was placed over the involved eyelid and the eyelid everted.  A crutiate incision was made over the chalazion and the lipogranulomatous material removed using the chalazion curette.  Scissors were used to break any septae.  The edges of the cruciate incision were excised using Willis scissors.  Hemostasis was obtained.  0.1 mL of Decadron 4 was injected trans conjunctivally. The lid was reverted to its normal position, the clamp removed, and the erythromycin antibiotic ointment applied.  The patient tolerated the procedure well and left in stable condition with a tube of antibiotic ointment.     I was present for the entire procedure. Jhonny Chaves MD

## 2021-04-27 NOTE — NURSING NOTE
"Chief Complaints and History of Present Illnesses   Patient presents with     Hordeolum Evaluation     Chief Complaint(s) and History of Present Illness(es)     Hordeolum Evaluation     Laterality: left upper lid    Course: gradually improving    Associated symptoms: Negative for lid swelling, lid pain, lid redness, eye pain and bleeding    Pain scale: 0/10              Comments     Kesha Allen is being seen for a consult today by the request of Dr. Carrillo for hordeolum of left upper eyelid.  Patient notes that he hasn't been taking the keflex regularly, took one yesterday, out of the drops (milagros poly dex), was doing WC more often before, not as much now, he notes after last visit with dr. Carrillo the bump \"busted\" and he is not having any lid tenderness or pain anymore, and notes it is still present but smaller. It has been present for \"too long\"     Debbie STERLING April 27, 2021 8:02 AM                    "

## 2022-02-01 ENCOUNTER — OFFICE VISIT (OUTPATIENT)
Dept: OPHTHALMOLOGY | Facility: CLINIC | Age: 32
End: 2022-02-01
Payer: COMMERCIAL

## 2022-02-01 DIAGNOSIS — H00.15 CHALAZION OF LEFT LOWER EYELID: Primary | ICD-10-CM

## 2022-02-01 PROCEDURE — 67800 REMOVE EYELID LESION: CPT | Mod: GC | Performed by: OPHTHALMOLOGY

## 2022-02-01 RX ORDER — ERYTHROMYCIN 5 MG/G
OINTMENT OPHTHALMIC ONCE
Status: COMPLETED | OUTPATIENT
Start: 2022-02-01 | End: 2022-02-01

## 2022-02-01 RX ADMIN — ERYTHROMYCIN 1 G: 5 OINTMENT OPHTHALMIC at 09:12

## 2022-02-01 ASSESSMENT — CONF VISUAL FIELD
OS_NORMAL: 1
OD_NORMAL: 1
METHOD: COUNTING FINGERS

## 2022-02-01 ASSESSMENT — VISUAL ACUITY
OS_SC+: -1+3
OS_SC: 20/30
OD_SC: 20/20
METHOD: SNELLEN - LINEAR
OS_PH_SC: 20/20
OD_SC+: -1

## 2022-02-01 ASSESSMENT — TONOMETRY
OS_IOP_MMHG: 19
IOP_METHOD: ICARE
OD_IOP_MMHG: 18

## 2022-02-01 ASSESSMENT — SLIT LAMP EXAM - LIDS: COMMENTS: MGD

## 2022-02-01 NOTE — PROGRESS NOTES
Chief Complaint(s) and History of Present Illness(es)     Follow Up     Laterality: left eye    Onset: gradual    Onset: 4 months ago    Course: stable    Associated symptoms: eye pain, dryness and photophobia.  Negative for   tearing, flashes and floaters    Treatments tried: warm compresses    Pain scale: 5/10    Comments: Chalazion of left upper eyelid              Comments     Pt here for new Chalazion of LLL starting about 4 months ago.  Treating with some WC.      HALLIE Reynaga February 1, 2022 8:16 AM     He has been using warm compresses at least once a day. He has not been using any other treatments. The bump seems to fluctuate in size throughout the past 4 months. It is tender over the spot. He believes that it has drained fluid at some point in the past. He feels that the bump is partially obstructing his vision.     Of note, he had a left upper eyelid chalazion excision in April 2021, and this chalazion has since resolved.     He does not ibuprofen or blood thinners.        Assessment & Plan     Kesha Allen is a 31 year old male with the following diagnoses:   Encounter Diagnosis   Name Primary?     Chalazion of left lower eyelid Yes     Present for 4 months, not responding to warm compresses.     PLAN:  - Chalazion I&D in clinic today.   - Erythromycin ointment TID for one week in left eye.       Theo Null MD  Resident Physician, PGY-2  Ophthalmology        Attending Physician Attestation:   Complete documentation of historical and exam elements from today's encounter can be found in the full encounter summary report (not reduplicated in this progress note). I personally obtained the chief complaint(s) and history of present illness. I confirmed and edited as necessary the review of systems, past medical/surgical history, family history, social history, and examination findings as documented by others; and I examined the patient myself. I personally reviewed the relevant tests, images,  and reports as documented above. I formulated and edited as necessary the assessment and plan and discussed the findings and management plan with the patient.    Jhonny Chaves MD    OPERATIVE NOTE: CHALAZION.    PRE-OPERATIVE DIAGNOSIS: Chalazion left lower lid.    POST-OPERATIVE DIAGNOSIS: Chalazion left lower lid.    PROCEDURE PERFORMED: Incision and drainage of chalazion left lower lid.    SURGEON: Jhonny Chaves    ASSISTANT: MD Bambi Martin MD     ANESTHESIA: Local infiltration with 2% lidocaine with epinephrine.    COMPLICATIONS: None    ESTIMATED BLOOD LOSS:  <5mL    HISTORY AND INDICATIONS: Patient presented with an enlarging chalazion in the involved eyelid.  This failed conservative medical management.  After the risks, benefits and alternatives of the proposed procedure were explained, informed consent was obtained.     PROCEDURE: Patient was brought to the minor procedure room and placed supine on the operating table.  Anesthesia was as listed above.  The area was prepped and draped in the typical fashion.  A chalazion clamp was placed over the involved eyelid and the eyelid everted.  A crutiate incision was made over the chalazion and the lipogranulomatous material removed using the chalazion curette.  Scissors were used to break any septae.  The edges of the cruciate incision were excised using Willis scissors.  Hemostasis was obtained.  The lid was reverted to its normal position, the clamp removed, and the erythromycin antibiotic ointment applied.  The patient tolerated the procedure well and left in stable condition with a tube of antibiotic ointment.     I was present for the entire procedure. Jhonny Chaves MD

## 2022-02-01 NOTE — NURSING NOTE
Chief Complaints and History of Present Illnesses   Patient presents with     Follow Up     Chalazion of left upper eyelid     Chief Complaint(s) and History of Present Illness(es)     Follow Up     Laterality: left eye    Onset: gradual    Onset: 4 months ago    Course: stable    Associated symptoms: eye pain, dryness and photophobia.  Negative for tearing, flashes and floaters    Treatments tried: warm compresses    Pain scale: 5/10    Comments: Chalazion of left upper eyelid              Comments     Pt here for new Chalazion of LLL starting about 4 months ago.  Treating with some WC.      HALLIE Reynaga February 1, 2022 8:16 AM

## 2023-10-03 ENCOUNTER — OFFICE VISIT (OUTPATIENT)
Dept: OPHTHALMOLOGY | Facility: CLINIC | Age: 33
End: 2023-10-03
Payer: COMMERCIAL

## 2023-10-03 ENCOUNTER — TELEPHONE (OUTPATIENT)
Dept: OPHTHALMOLOGY | Facility: CLINIC | Age: 33
End: 2023-10-03

## 2023-10-03 DIAGNOSIS — H00.11 CHALAZION OF RIGHT UPPER EYELID: Primary | ICD-10-CM

## 2023-10-03 PROCEDURE — 67800 REMOVE EYELID LESION: CPT | Mod: E3 | Performed by: OPHTHALMOLOGY

## 2023-10-03 PROCEDURE — 99212 OFFICE O/P EST SF 10 MIN: CPT | Mod: 25 | Performed by: OPHTHALMOLOGY

## 2023-10-03 RX ORDER — ERYTHROMYCIN 5 MG/G
OINTMENT OPHTHALMIC ONCE
Status: COMPLETED | OUTPATIENT
Start: 2023-10-03 | End: 2023-10-03

## 2023-10-03 RX ADMIN — ERYTHROMYCIN 1 G: 5 OINTMENT OPHTHALMIC at 09:14

## 2023-10-03 ASSESSMENT — CONF VISUAL FIELD
OD_SUPERIOR_TEMPORAL_RESTRICTION: 0
OD_INFERIOR_TEMPORAL_RESTRICTION: 0
METHOD: COUNTING FINGERS
OD_INFERIOR_NASAL_RESTRICTION: 0
OS_INFERIOR_TEMPORAL_RESTRICTION: 0
OD_SUPERIOR_NASAL_RESTRICTION: 0
OD_NORMAL: 1
OS_INFERIOR_NASAL_RESTRICTION: 0
OS_SUPERIOR_TEMPORAL_RESTRICTION: 0
OS_NORMAL: 1
OS_SUPERIOR_NASAL_RESTRICTION: 0

## 2023-10-03 ASSESSMENT — TONOMETRY
OS_IOP_MMHG: 13
OD_IOP_MMHG: 13
IOP_METHOD: ICARE

## 2023-10-03 ASSESSMENT — VISUAL ACUITY
OS_SC: 20/25
METHOD: SNELLEN - LINEAR
OS_SC+: -2
OD_SC: 20/25
OD_SC+: -2

## 2023-10-03 ASSESSMENT — SLIT LAMP EXAM - LIDS: COMMENTS: MGD

## 2023-10-03 NOTE — NURSING NOTE
Chief Complaints and History of Present Illnesses   Patient presents with    Stye / Hordeolum Evaluation     Chief Complaint(s) and History of Present Illness(es)       Stye / Hordeolum Evaluation              Laterality: right upper lid and left upper lid    Associated symptoms: Negative for lid redness, red eye, blurred vision and discharge              Comments    Patient staes SUZY and RUL stye. Patient states it's tender after warm compresses. Patient states this has been present 6 months.  VAUGHN Muro October 3, 2023 8:31 AM

## 2023-10-03 NOTE — PROGRESS NOTES
Chief Complaint(s) and History of Present Illness(es)     Stye / Hordeolum Evaluation            Laterality: right upper lid and left upper lid    Associated symptoms: Negative for lid redness, red eye, blurred vision   and discharge          Comments    Patient staes SUZY and RUL stye. Patient states it's tender after warm   compresses. Patient states this has been present 6 months.  Hortensia RojoVAUGHN October 3, 2023 8:31 AM       Assessment & Plan     Kesha Allen is a 32 year old male with the following diagnoses:     ICD-10-CM    1. Chalazion of right upper eyelid  H00.11 erythromycin (ROMYCIN) ophthalmic ointment     Chalazion Excision          Present for 6 months, not responding to warm compresses    Plan:  -Chalazion I&D in clinic today.  -Erythromycin travis TID to right eye for one week then stop.  -Given multiple chalazia requiring I&D, recommend regular use of fish oil. May consider addition of low dose doxycycline if patient develops recurrent/multiple chalazia in the future.  -Start AT PRN for comfort    Patient disposition:   Return if symptoms worsen or fail to improve, for Follow Up.     Joie Savage MD  Oculoplastic Surgery Fellow       Attending Physician Attestation: Complete documentation of historical and exam elements from today's encounter can be found in the full encounter summary report (not reduplicated in this progress note). I personally obtained the chief complaint(s) and history of present illness. I confirmed and edited as necessary the review of systems, past medical/surgical history, family history, social history, and examination findings as documented by others; and I examined the patient myself. I personally reviewed the relevant tests, images, and reports as documented above. I formulated and edited as necessary the assessment and plan and discussed the findings and management plan with the patient.  -Jhonny Chaves MD

## 2023-10-04 ENCOUNTER — LAB (OUTPATIENT)
Dept: LAB | Facility: CLINIC | Age: 33
End: 2023-10-04
Payer: COMMERCIAL

## 2023-10-04 DIAGNOSIS — Z77.21 EXPOSURE TO BLOOD OR BODY FLUID: ICD-10-CM

## 2023-10-04 LAB
HBV SURFACE AG SERPL QL IA: NONREACTIVE
HIV 1+2 AB+HIV1P24 AG SERPLBLD IA.RAPID: NON REACTIVE
HIV 1+2 AB+HIV1P24 AG SERPLBLD IA.RAPID: NON REACTIVE
HIV INTERPRETATION: NORMAL

## 2023-10-04 PROCEDURE — 36415 COLL VENOUS BLD VENIPUNCTURE: CPT | Performed by: PATHOLOGY

## 2023-10-05 LAB
HCV RNA SERPL NAA+PROBE-ACNC: NOT DETECTED IU/ML
HIV 1+2 AB+HIV1 P24 AG SERPL QL IA: NONREACTIVE

## 2024-12-23 NOTE — TELEPHONE ENCOUNTER
"Hali Aleman (63 y.o. Female)    PLEASE SEE ATTACHED FOR INPT AUTH  OP 12/19, IP 12/23  Pt came in on 12/19 for Elective OP procedure   CPT codes 57620, 28618  Pt with post op complications, requiring additional days of IVF, awaiting bowel function to return, currently still in hospital   REF#[id number]  PLEASE CALL PADMINI OLGUIN RN/ DEPT [phone] FAX  DEPT [phone]  THANK YOU   PADMINI OLGUIN RN  [facility]      Date of Birth   1961    Social Security Number       Address   [address] [place] [address]    Home Phone   [phone]    MRN   [id number]       [facility]    Marital Status   Single                            Admission Date   12/19/24 OP  12/23/24 INPATIENT    Admission Type   Elective    Admitting Provider   Eladio Peña MD    Attending Provider   Eladio Peña MD    Department, Room/Bed   [address], 87/1       Discharge Date       Discharge Disposition       Discharge Destination                                 Attending Provider: Eladio Peña MD    Allergies: Sulfa Antibiotics    Isolation: None   Infection: None   Code Status: CPR    Ht: 156.2 cm (61.5\")   Wt: 87.1 kg (192 lb)    Admission Cmt: None   Principal Problem: Incisional hernia [K43.2]                   Active Insurance as of 12/19/2024       Primary Coverage       Payor Plan Insurance Group Employer/Plan Group    [employer] EMPLOYEE [id number]       Payor Plan Address Payor Plan Phone Number Payor Plan Fax Number Effective Dates    [address] [phone]  1/1/2022 - None Entered    [facility] [id number]         Subscriber Name Subscriber Birth Date Member ID       HALI ALEMAN 1961 [id number]               Secondary Coverage       Payor Plan Insurance Group Employer/Plan Group    MEDICARE MEDICARE A ONLY        Payor Plan Address Payor Plan Phone Number Payor Plan Fax " Spoke with patient and scheduled Lab at [facility]. Patient will come to [facility] as instructed. Patient is aware of date, time and location.-Per Patient   Number Effective Dates    [address] [phone]  11/1/2024 - None Entered    [facility] [id number]         Subscriber Name Subscriber Birth Date Member ID       JAMIE ALEMAN 1961 [id number]                     Emergency Contacts        (Rel.) Home Phone Work Phone Mobile Phone    AJ MICHELLE (Relative) -- -- [phone]    SANDRA ALEMAN (Daughter) [phone] --    AMAURI ABREU (Mother) -- -- [phone]    Aj Michelle (Zohra) -- -- [phone]    Aj Michelle -- -- --              [facility] [id number]  Tax ID [id number]     History & Physical        Del Saab MD at 12/19/24 0843          Allergies  Reviewed Allergies  Medications  Reviewed Medications  amLODIPine 10 mg tablet  TAKE 1 TABLET BY MOUTH EVERY DAY  05/21/24   filled surescripts  amLODIPine 5 mg tablet  TAKE 1 TABLET BY MOUTH EVERY DAY  12/22/23   filled surescripts  azithromycin 250 mg tablet  TAKE 1 TABLET BY MOUTH AS DIRECTED TAKE 2 TABLETS TODAY THEN 1 DAILY  06/10/24   filled surescripts  clobetasoL 0.05 % topical ointment  PLEASE SEE ATTACHED FOR DETAILED DIRECTIONS  10/14/23   filled surescripts  gabapentin 300 mg capsule  TAKE 1 CAPSULE BY MOUTH 2 (TWO) TIMES A DAY FOR 14 DAYS.  12/22/23   filled surescripts  methylPREDNISolone 4 mg tablets in a dose pack  TAKE 6 TABLETS ON DAY 1 AS DIRECTED ON PACKAGE AND DECREASE BY 1 TAB EACH DAY FOR A TOTAL OF 6 DAYS  06/10/24   filled surescripts  ondansetron 4 mg disintegrating tablet  04/08/24   filled surescripts  oxyCODONE 5 mg tablet  PLEASE SEE ATTACHED FOR DETAILED DIRECTIONS  12/22/23   filled surescripts  pantoprazole 40 mg tablet,delayed release  TAKE 1 TABLET BY MOUTH EVERY DAY IN THE MORNING  04/30/24   filled surescripts  promethazine-DM 6.25 mg-15 mg/5 mL oral syrup  TAKE 5 MILLILITERS BY MOUTH EVERY 4 TO 6 HOURS AS NEEDED FOR COUGH  06/10/24   filled surescripts  topiramate 25 mg tablet  04/08/24    filled surescripts  Problems  None recorded.  GYN History  (not configured)  Screening  None recorded.  HPI  Pleasant [age]yo lady with concerns of abominal skin excess. Does have history of abdominal surgery and significant bulge or hernia and interested in correction of this at the same time.  ROS  Patient reports abdominal pain but reports no nausea and no vomiting. She reports no fever, no night sweats, no significant weight gain, and no significant weight loss. She reports no dry eyes and no vision change. She reports no difficulty hearing. She reports no frequent nosebleeds. She reports no sore throat and no bleeding gums. She reports no chest pain, no arm pain on exertion, and no shortness of breath when walking. She reports no cough, no wheezing, and no shortness of breath. She reports no muscle aches and no muscle weakness. She reports no abnormal mole and no non-healing areas. She reports no gait dysfunction and no paralysis. She reports no dementia and no delirium.  Physical Exam  Chaperone: Chaperone: present.    Constitutional: General Appearance: healthy-appearing, well-nourished, and well-developed. Level of Distress: NAD. Ambulation: ambulating normally.    Psychiatric: Mental Status: normal mood and affect and active and alert.    Head: Head: normocephalic and atraumatic.    Eyes: Lids and Conjunctivae: non-injected. Pupils: PERRLA. EOM: EOMI.    Cardiovascular: Heart Auscultation: RRR.    Abdomen: Bowel Sounds: ventral incison and hernia, gr2 panniculus.    Musculoskeletal:: Motor Strength and Tone: normal tone and motor strength. Joints, Bones, and Muscles: normal movement of all extremities. Extremities: no cyanosis or edema.    Neurologic: Gait and Station: normal gait and station.    Skin: Inspection and palpation: no rash or lesions.  Assessment / Plan  Discussed panniculectomy at the time of her hernia repair.  Understands the goal is removal of the lower abdominal skin and that the upper  abdomen may still have some laxity.  Discussed risks of loss of umbilicus, wound healing, need for additiaonl surgery, bleedidng, infection.    Electronically signed by Del Saab MD at 24 0815       Eladio Peña MD at 24 0813          CC: Incisional hernia follow-up     HPI: Patient is a very pleasant 62-year-old female that is here today for incisional hernia repair.  She has seen Dr. Saab that would perform abdominoplasty at the same time.     Patient has history of sigmoid colonic stricture and underwent robotic converted to open sigmoid colon resection with colostomy that was done by Dr. Gaston in 2/3/2023.  Postoperatively, she have intra-abdominal abscess that was drained twice by IR.   She eventually underwent robotic colostomy takedown on 2023 by Dr. Gaston     PMH: Hypertension, obesity, colitis, nausea and vomiting, diverticulitis, colonic stricture     PSH:   -[place] teeth extraction  -Left salpingo-oophorectomy  -Left breast lumpectomy  -   -Open hysterectomy   -Laparoscopic gastric banding   -Colonoscopy   -Colposcopy   -Laparoscopic sleeve gastrectomy   -Colonoscopy and upper endoscopy   -Robotic converted to open sigmoid colectomy with colostomy 2023.  Dr. Gaston  -Robotic assisted laparoscopic colostomy takedown 2023.  Dr. Gaston     MEDS: Flonase, trazodone, amlodipine, clobetasol, Protonix, vitamin D3, biotin, multivitamins, Tylenol, Linzess     ALL: Sulfa antibiotic     FH and SH: Family history is not on contributory.  Denies smoking drinking or taking any drugs     ROS:   As per HPI     PE:   Body mass index is 35.54 kg/m².   Alert and oriented ×3, no acute distress.  Head is normocephalic and atraumatic.  Neck is supple there is no thyromegaly or lymphadenopathy  Chest is clear bilaterally there is no added sounds  Regular rate and rhythm, no murmurs  Abdomen is soft and nontender.  There is a large incisional  hernia over the abdomen, worse in the upper abdomen.  Multiple well-healed scars  No clubbing cyanosis or edema in lower or upper extremities     Diagnostic studies:   CT scan of the abdomen and pelvis that was performed on 4/13/2024 show evidence of very large incisional hernia with separation of the rectus muscle of approximately 15 cm over the upper abdomen 12 cm over the mid abdomen.  There is a very small rectus muscle superiorly bilaterally.  The rectus muscle on the left side at the mid abdomen is almost completely gone.  There is no evidence of incarceration strangulation.  There is history of prior gastric sleeve, prior sigmoid colonic anastomosis     Operative report from 2/3/2023 was reviewed.  There were adhesions from small bowel to the sigmoid colon and a tedious dissection at the surgical area in the left lower quadrant but there is no mentions of large amount of adhesions preventing the completion of the procedure robotically.  The procedure was finally converted to open due to capsular tear of the spleen.     Operative report from 12/27/2023 was reviewed colostomy takedown was achieved robotically without complication.  No description of dense intra-abdominal adhesions.     Labs 12/12/2024: MRSA screen normal, potassium 3.4, normal CBC     Assessment and plan     The patient is a very pleasant 62-year-old female with multiple abdominal operations that have left her with very large incisional hernia with loss of the mind.  She is interested in incisional hernia repair.  I offered her open incisional hernia repair with bilateral component separation and mesh placement.  She is interested in undergoing abdominoplasty that will be done at the same time by Dr. Saab.  Discussed with the patient about risk and benefits of the procedure that include bleeding, infection, mesh infection, chronic pain, hernia recurrence that can be up to approximately 20% after repair, wound complications, need for wound  vacs, chronic antibiotic use.  She understand that she will need to have several drains after surgery that will sequentially be removed.  Pain control will try to be with nonnarcotic pain medication.  Will encourage early ambulation.  Patient to continue to lose weight prior to undergoing surgical repair.     Patient verbalized understanding and agreed with the plan.     Electronically signed by Eladio Peña MD at 12/19/24 0815       H&P signed by [employer], [place] at 12/19/24 0959         [Media Unavailable] Scan on 12/19/2024 0552 by [employer], [employer]: [facility], 12/18/2024          Electronically signed by [employer], [employer] at 12/19/24 0959       Facility-Administered Medications as of 12/23/2024   Medication Dose Route Frequency Provider Last Rate Last Admin    [COMPLETED] acetaminophen (TYLENOL) tablet 1,000 mg  1,000 mg Oral Once Eladio Peña MD   1,000 mg at 12/19/24 0850    acetaminophen (TYLENOL) tablet 1,000 mg  1,000 mg Oral Q6H PRN Eladio Peña MD   1,000 mg at 12/23/24 1239    amLODIPine (NORVASC) tablet 10 mg  10 mg Oral Daily Eladio Peña MD   10 mg at 12/23/24 0855    sennosides-docusate (PERICOLACE) 8.6-50 MG per tablet 2 tablet  2 tablet Oral BID PRN Eladio Peña MD   2 tablet at 12/22/24 2203    And    polyethylene glycol (MIRALAX) packet 17 g  17 g Oral Daily PRN Eladio Peña MD        And    bisacodyl (DULCOLAX) EC tablet 5 mg  5 mg Oral Daily PRN Eladio Peña MD        And    bisacodyl (DULCOLAX) suppository 10 mg  10 mg Rectal Daily PRN Eladio Peña MD        Calcium Replacement - Follow Nurse / BPA Driven Protocol   Not Applicable PRN Eladio Peña MD        [COMPLETED] cefOXItin (MEFOXIN) 2,000 mg in sodium chloride 0.9 % 100 mL MBP  2,000 mg Intravenous 30 Min Pre-Op Eladio Peña  mL/hr at 12/19/24 0918 2,000 mg at 12/19/24  0918    [COMPLETED] celecoxib (CeleBREX) capsule 200 mg  200 mg Oral Once Eladio Peña MD   200 mg at 24 0851    Enoxaparin Sodium (LOVENOX) syringe 40 mg  40 mg Subcutaneous Daily Eladio Peña MD   40 mg at 24 0855    [COMPLETED] famotidine (PEPCID) injection 20 mg  20 mg Intravenous Once Joie Morillo MD   20 mg at 24 0848    fluticasone (FLONASE) 50 MCG/ACT nasal spray 2 spray  2 spray Each Nare PRN Eladio Peña MD        [COMPLETED] gabapentin (NEURONTIN) capsule 600 mg  600 mg Oral Once Eladio Peña MD   600 mg at 24 0857    HYDROmorphone (DILAUDID) injection 0.5 mg  0.5 mg Intravenous Q2H PRN Eladio Peña MD        Magnesium Standard Dose Replacement - Follow Nurse / BPA Driven Protocol   Not Applicable PRN Eladio Peña MD        methocarbamol (ROBAXIN) tablet 500 mg  500 mg Oral Q8H PRN Eladio Peña MD   500 mg at 24 2201    ondansetron ODT (ZOFRAN-ODT) disintegrating tablet 4 mg  4 mg Oral Q6H PRN Eladio Peña MD   4 mg at 24 2336    Or    ondansetron (ZOFRAN) injection 4 mg  4 mg Intravenous Q6H PRN Eladio Peña MD        [COMPLETED] oxyCODONE (ROXICODONE) immediate release tablet 10 mg  10 mg Oral Once Del Saab MD   10 mg at 24 0850    oxyCODONE (ROXICODONE) immediate release tablet 5 mg  5 mg Oral Q6H PRN Eladio Peña MD   5 mg at 24 2030    pantoprazole (PROTONIX) EC tablet 40 mg  40 mg Oral QAM Eladio Peña MD   40 mg at 24 0621    Phosphorus Replacement - Follow Nurse / BPA Driven Protocol   Not Applicable Eladio Calderon MD        Potassium Replacement - Follow Nurse / BPA Driven Protocol   Not Applicable PRN Eladio Peña MD        pregabalin (LYRICA) capsule 50 mg  50 mg Oral Q12H Eladio Peña MD   50 mg at 24 0855    [] scopolamine  patch 1 mg/72 hr  1 patch Transdermal Continuous Eladio Peña MD   1 patch at 12/19/24 0849    sennosides-docusate (PERICOLACE) 8.6-50 MG per tablet 1 tablet  1 tablet Oral BID Hailey Swift MD   1 tablet at 12/23/24 0855    simethicone (MYLICON) chewable tablet 125 mg  125 mg Oral Q6H PRN Eladio Peña MD        [COMPLETED] sodium chloride 0.9 % bolus 1,000 mL  1,000 mL Intravenous Once Eladio Peña MD 2,000 mL/hr at 12/20/24 1221 1,000 mL at 12/20/24 1221    sodium chloride 0.9 % flush 3 mL  3 mL Intravenous Q12H Eladio Peña MD   3 mL at 12/23/24 0917    sodium chloride 0.9 % flush 3-10 mL  3-10 mL Intravenous PRN Eladio Peña MD        sodium chloride 0.9 % infusion 40 mL  40 mL Intravenous PRN Eladio Peña MD        traZODone (DESYREL) tablet 50 mg  50 mg Oral Nightly PRN Eladio Peña MD   50 mg at 12/22/24 2201        Operative/Procedure Notes (all)        Eladio Peña MD at 12/19/24 1007  Version 1 of 1         Date of procedure: 12/19/2024    Primary Surgeon: Dr. Peña    Assistant: Halie Hardin CSA dosing charge for retraction, exposure    Procedure performed:   -Open incisional hernia repair 14 x 17 cm with mesh placement  -Myofascial cutaneous flap release right side (retrorectus and transversus abdominis muscle)  -Myofascial cutaneous flap release left side (retrorectus and transversus abdominis muscle)    Anesthesia: General    EBL: 500 cc    Complications: None    Findings: Large incisional hernia, redundant skin and subcutaneous tissue in the upper abdomen requiring intraoperative plastic surgery consultation for excess skin treatment    Implants: 35 x 35 cm phasix mesh    Condition of the patient: Stable    Indications: 63-year-old female with history of multiple abdominal operations.  She had developed a large incisional hernia with loss of domain.  We discussed with the patient about treatment  options and I recommend that she undergoes open incisional hernia repair with bilateral component separation.  Risk and benefits of procedure including bleeding, infection, wound complications, chronic pain, mesh infection, hernia recurrence discussed in detail with the patient that verbalized understanding and agreed with the plan.  Patient has decided to undergo abdominoplasty by Dr. Saab that we will share the case with me.    Description: Patient was taken to operative room where he was placed in the OR table in the supine position.  Preoperative antibiotics were given and SCDs were placed.  Patient underwent general endotracheal anesthesia.  Patient abdomen was then prepped and draped in usual sterile fashion.  Timeout was performed the patient was correctly identified.  Half percent Marcaine with epinephrine was injected over the midline.  With scalpel an incision was performed from the epigastric to the infraumbilical area.  Dissection was carried into the subcutaneous tissue and abdominal wall fascia.  Abdominal cavity was entered.  Upon exploration of the abdominal cavity there was no evidence of injury from the procedure.  There were adhesions from the omentum to the right posterior abdominal wall.  There were adhesions from small bowel to the epigastric area.  Sharp and blunt dissection of intra-abdominal adhesions was performed with blunt dissection and electrocoagulation.  I was able to free the abdominal wall from adhesions.  The patient had a very large incisional hernia.  I started creating retrorectus flap over the left side of the abdomen.  This was done by transecting the posterior rectus sheath 5 mm lateral to the linea alba.  Retrorectus sheath was dissected from rectus muscle superiorly and inferiorly.  Preperitoneal space was then entered in the lower abdomen and dissection was performed from the peritoneum to the space of Retzius.  There was no evidence of inguinal hernia, round ligament  was transected in between clips.  I then proceeded to start the transversus abdominis muscle release initially from the inferior portion where the arcuate ligament was recognized and dissected from the transversus abdominis fascia.  The posterior lamella of the internal oblique muscle was transected and dissection continue all the way to the mid aspect of the abdominal wall.  I then proceeded to transect the posterior lamella of the internal oblique superiorly and transversus abdominis muscle was found.  This was transected.  Dissection of the transversus abdominis fascia from the transversus abdominis muscle was performed laterally all the way to the psoas muscle, superiorly to the level of the diaphragm.  Transection of the fascia superiorly was medialized so to have access to the subxiphoid space.  Happy with the dissection I proceeded to continue with the component separation over the right side.  Posterior rectus sheath was transected 5 mm lateral to the linea alba.  Dissection from the posterior rectus sheath to the muscle was performed bluntly and with sharp dissection.  There were adhesions from the posterior rectus sheath to the muscle and dissection was a little bit tedious on the superior aspect of it.  Proximal posterior lamella of internal oblique muscle was transected and transversus abdominis muscle was then transected and  from the transversus abdominis fascia.  This was performed superiorly and  from the diaphragmatic fibers that were preserved.  I then continued the transection of the fascia medially to allow correction of the subxiphoid space on the right and left side.  I then dissected peritoneal attachments from the space of Retzius and from the inguinal canal.  There was no evidence of right inguinal hernia.  Round ligament was dissected circumferentially and transected between clips.  I then continued separation of the transverse abdominal fascia from the internal oblique.   Transection of the posterior sheath of the internal oblique muscle was performed and separation of the transversus abdominis muscle was continued superiorly until both sides of the component separation met at the middle of the abdominal wall.  Dissection continue all the way laterally to the psoas muscle, superiorly to the fibers of the diaphragm and inferiorly to the space of Retzius.  I then explored the abdominal cavity there was no evidence of injury from the procedure.  I proceeded to close the posterior rectus sheath with running 2-0 Vicryl suture.  Instrument sponge count were correct prior to closing the posterior rectus sheath.  There was minimal tension at the closure.  Several rents of the peritoneum were closed with interrupted 2-0 Vicryl suture.  I then brought to the operative field a 35 x 35 cm phasic's mesh that was placed over the posterior rectus sheath in a matt-shaped configuration.  The mesh was secured inferiorly with 2-0 PDS x 2 to the Jack's ligament and superiorly to the subxiphoid area.  Irrigation performed there was no evidence of bleeding.  Cesar hemostatic agent was placed over the surgical bed.  Tisseel was sprayed over the mesh.  219 [address] drains were placed through the abdominal wall fascia superiorly on the right and the left in place over the mesh.  I then proceeded to close subcutaneous flaps to allow closure of the anterior rectus sheath.  This was done bilaterally.  Hernia sacs were dissected and sent for pathological analysis.  Then the abdominal wall was closed with running #1 PDS from the superior and inferior portion of the wounds.  The abdominal wall closed with moderate tension.  Drains were secured in place with 2-0 nylon.  Patient had redundant skin flaps over the upper abdomen and I consulted Dr. Saab about performing abdominoplasty that will be added to his planned procedure.  He assessed the skin flap and he decided he will proceed with  abdominoplasty in the upper abdomen also.  At this moment instrument sponge count were correct.  Dr. Saab then continue with the abdominoplasty and panniculectomy.    Eladio Peña MD, FACS  General, Minimally Invasive and Endoscopic Surgery  [facility]    [address], [address]  [place], [place], [address]  P: [phone]  F: [phone]            Electronically signed by Eladio Peña MD at 12/19/24 1388       Del Saab MD at 12/19/24 1528  Version 1 of 1         Pre-Operative Diagnosis: Abdominal lipodystrophy, skin defect following abdominal hernia repair    Post-Operative Diagnosis: Same    Procedure Performed:   1.  Abdominal panniculectomy  2.  Abdominal skin adjacent tissue rearrangement 20 x 20 cm    Surgeon: DENZEL Saab MD    Assistant: Anan Caceres PA-C  Please note Ms. Caceres's assistance was critical to the successful outcome of the case.  She was the first and only assistant and no other qualified assistance was available.  She directly assisted with flap mobilization and identification of vascular pedicle, hemostasis, immaculate wound closure.    Anesthesia: General    Estimated Blood Loss:  20    Specimens: None    Complications: None    Indications: She presented referral Dr. Peña after discussing repair of her very large ventral hernia.  She also had significant lower abdominal skin excess.  In order appropriately.  Hernia large skin defect was expected with significant undermining along with her symptomatic lower abdominal panniculus which had been refractory to conservative management.  We discussed panniculectomy and then intraoperatively the large amount of undermining required mobilization of some surrounding skin to fill the defect and eliminate the dead space.    We discussed risks, benefits and alternatives including but not limited to: bleeding, infection, asymmetry, poor or slow wound healing, need for further surgery, possible  recurrence.  The patient elected to proceed.    Description of Procedure: The patient was met in the preoperative holding area.  All questions were answered and informed consent was assured.      She was marked in standing position and pinch test of the lower abdomen was performed to approximate panniculectomy skin excision.  She was transferred to the operating room placed supine on the table with arms secured and padded.    After induction of appropriate anesthesia, a timeout was performed correctly identifying the patient, operative site, and procedure to be performed.  All present were in agreement.    Landmarks were confirmed with Dr. Peña and the planned excision of the midline attenuated scar to gain access to the hernia sac.  After completion of the hernia repair there is a large midline defect and significant lateral undermining with skin that likely would be left devascularize without mobilization of healthy tissue into the defect.  The midline incision was extended inferiorly into the excess lower abdominal skin unable to be then mobilized toward the abdominal midline creating 2 random pattern skin flaps measuring 10 x 20 cm each.  The midline wound was copiously irrigated and immaculate hemostasis was achieved.  The flaps were then advanced with progressive tension sutures toward the midline with 2-0 PDS anchoring the skin flaps to the abdominal wall and then in the midline a running #1 strata fix suture used to reapproximate Jay Jay's fascia and anchoring this down to the abdominal wall over a drain.    Panniculectomy was then performed with incision made in the lower abdomen just above the flexion crease.  It was beveled superiorly until reaching the abdominal wall.  The pinch test was then performed again and marks adjusted to the appropriate level tension after the mobilization of the midline wound.  This upper incision was then incised and then carried down to the abdominal wall meeting the inferior  dissection.  This wound was then copiously irrigated and immaculate hemostasis was achieved.  Drain was placed.  Closure was performed of the Jay Jay layer fascia with a running #1 strata fix suture.  The remaining skin closure was performed with deep dermal closure with 3-0 Monocryl and INSORB staples, 3-0 Monocryl strata fix in the intracuticular.  Incisions dressed with Steri-Strips and she was placed in a well-padded abdominal binder.     The patient was then aroused from anesthesia with ease and transferred to the postoperative care area in good condition. All sponge, needle, and instrument counts were correct.       Electronically signed by Del Saab MD at 12/19/24 1533          Physician Progress Notes (last 4 days)        Eladio Peña MD at 12/23/24 1124          Postoperative day 4 status post incisional hernia repair with bilateral component separation and mesh placement and panniculectomy and abdominal tissue rearrangement    S: Patient is doing fine and reports feeling uncomfortable with the abdominal binder in place.  Otherwise she has been able to tolerate regular diet and she is having bowel function.  Feels short of breath when moving from chair to the bed.    O:   Vitals:    12/22/24 2022 12/22/24 2300 12/23/24 0500 12/23/24 0951   BP: 143/84  136/84 129/81   BP Location: Left arm  Right arm Left arm   Patient Position: Lying  Lying Sitting   Pulse: 106  87 85   Resp: 18  16 18   Temp: 98.2 °F (36.8 °C)  98.3 °F (36.8 °C) 98 °F (36.7 °C)   TempSrc: Oral  Oral Oral   SpO2: 90% 95% 97% 93%   Weight:       Height:          Drain #1 30 cc  Drain #2 45 cc  Drain #3 55 cc  Drain #4 40 cc  Alert, no acute distress  Breathing comfortable  Regular rate rhythm  Abdomen soft, appropriate tender, no rebound or guarding no peritoneal sign, incision clean dry and intact, all drains serosanguineous    White blood cell count 13,000, downtrending, hemoglobin stable at 9.5, platelets  "300,000  All other labs stable    Assessment and plan    Postoperative day 4, slowly progressing.  Pain is well-controlled.  Still a little bit short of breath when ambulating.  She has not been working with incentive spirometer as much as that she do.  I will remove drain #1  Okay for her to ambulate   Continue SCDs and Lovenox  Continue regular diet  We will DC milk of magnesia, start MiraLAX as needed, stool softeners    Likely home tomorrow    Electronically signed by Eladio Peña MD at 12/23/24 1132       Hailey Swift MD at 12/22/24 1230          General Surgery Progress Note    Chief complaint: Postop day 3 incisional hernia repair with bilateral TAR, mesh placement by Dr. Peña, and panniculectomy, abdominal tissue rearrangement by Dr. Saab    Interval history: Patient states she is feeling a little more uncomfortable today, states she can't get comfortable sleeping and she has not passed gas or had a bowel movement yet.  She is tolerating her regular diet without nausea.  She has been out of bed.  She has voided since her Parrish came out.    Vital signs:  /75 (BP Location: Left arm, Patient Position: Lying)   Pulse 82   Temp 96.4 °F (35.8 °C) (Oral)   Resp 16   Ht 156.2 cm (61.5\")   Wt 87.1 kg (192 lb)   LMP  (LMP Unknown)   SpO2 92%   BMI 35.69 kg/m²     Intake/output:  Intake & Output (last day)         12/21 0701  12/22 0700 12/22 0701  12/23 0700    P.O. 340 240    I.V. (mL/kg)      Total Intake(mL/kg) 340 (3.9) 240 (2.8)    Urine (mL/kg/hr) 300 (0.1) 0 (0)    Drains 130 45    Total Output 430 45    Net -90 +195          Urine Unmeasured Occurrence 3 x 1 x            Physical exam:  Sitting up in bed eating prior to exam  NEURO: awake, alert, no gross focal deficits  PSYCH: interactive, cooperative  PULM: normal respirations on room air  CV: well perfused, no peripheral edema  GI: abdomen soft, appropriately tender, incisions c/d with iodoform gauze and fluffs " dressings with abdominal binder in place; all four drains with scant serosanguinous fluid  : No Parrish    Diagnostics:  Results from last 7 days   Lab Units 12/22/24  0946 12/21/24  0336 12/20/24  0537   WBC 10*3/mm3 14.63* 17.04* 20.71*   HEMOGLOBIN g/dL 9.4* 9.4* 10.9*   HEMATOCRIT % 28.7* 29.2* 33.0*   PLATELETS 10*3/mm3 242 223 277     Results from last 7 days   Lab Units 12/22/24  0946 12/21/24  0337 12/20/24  0537   SODIUM mmol/L 139 134* 134*   POTASSIUM mmol/L 3.7 4.0 4.3   CHLORIDE mmol/L 112* 107 105   CO2 mmol/L 21.0* 19.2* 20.3*   BUN mg/dL 13 22 17   CREATININE mg/dL 0.77 0.99 1.56*   CALCIUM mg/dL 8.2* 8.0* 8.4*   GLUCOSE mg/dL 147* 99 129*         Assessment:  Postop day 3 incisional hernia repair with bilateral TAR, mesh placement by Dr. Peña, and panniculectomy, abdominal tissue rearrangement by Dr. Saab    [facility]. Tolerating diet without nausea. No gas/BM yet.  Drains with decreasing serosanguinous output. Incisions healing in good order.     Plan:  Continue GI low irritant diet.   Continue milk of magnesia BID.  Add docusate senna.  Monitor bowel function.  Out of bed, ambulate, IS.   Continue abdominal binder.  Drains 2 and 3 (lower drains) to stay in place per Dr. Saab.  Upper drains to remain until output less than 30cc/day.    Hailey Swift MD  General, Robotic and Endoscopic Surgery  [facility]    [address], [address]  [place], [place], [address]  P: [phone]  F: [phone]          Electronically signed by Hailey Swift MD at 12/22/24 6704       Hailey Swift MD at 12/21/24 1130          General Surgery Progress Note    Chief complaint: Postop day 2 incisional hernia repair with bilateral TAR, mesh placement by Dr. Peña, and panniculectomy, abdominal tissue rearrangement by Dr. Saab    Interval history: Patient states she is feeling okay today.  She denies any nausea or vomiting.  Her pain is adequately controlled.  She has been walking  "and out of bed.  Her Stark catheter is still in place.  She has not had any bowel movement or passed any gas.    Vital signs:  /72 (BP Location: Left arm, Patient Position: Lying)   Pulse 87   Temp 98.3 °F (36.8 °C) (Oral)   Resp 16   Ht 156.2 cm (61.5\")   Wt 87.1 kg (192 lb)   LMP  (LMP Unknown)   SpO2 94%   BMI 35.69 kg/m²     Intake/output:  Intake & Output (last day)         12/20 0701 12/21 0700 12/21 0701 12/22 0700    P.O. 240 340    I.V. (mL/kg) 2955.6 (33.9)     Total Intake(mL/kg) 3195.6 (36.7) 340 (3.9)    Urine (mL/kg/hr) 1425 (0.7)     Drains 310     Blood      Total Output 1735     Net +1460.6 +340                  Physical exam:  NEURO: awake, alert, no gross focal deficits, sitting up in chair  PSYCH: interactive, cooperative  PULM: normal respirations on room air  CV: well perfused, no peripheral edema  GI: abdomen soft, appropriately tender, incisions c/d with iodoform gauze and fluffs dressings with abdominal binder in place; all four drains with serosanguinous fluid, only a scant amount in drains 2 and 3 (lower drains)  : stark draining clear yellow urine    Diagnostics:  Results from last 7 days   Lab Units 12/21/24  0336 12/20/24  0537   WBC 10*3/mm3 17.04* 20.71*   HEMOGLOBIN g/dL 9.4* 10.9*   HEMATOCRIT % 29.2* 33.0*   PLATELETS 10*3/mm3 223 277     Results from last 7 days   Lab Units 12/21/24  0337 12/20/24  0537   SODIUM mmol/L 134* 134*   POTASSIUM mmol/L 4.0 4.3   CHLORIDE mmol/L 107 105   CO2 mmol/L 19.2* 20.3*   BUN mg/dL 22 17   CREATININE mg/dL 0.99 1.56*   CALCIUM mg/dL 8.0* 8.4*   GLUCOSE mg/dL 99 129*         Assessment:  Postop day 2 incisional hernia repair with bilateral TAR, mesh placement by Dr. Peña, and panniculectomy, abdominal tissue rearrangement by Dr. Katiana SCHNEIDER. Tolerating diet without nausea. No gas/BM yet.  Drains with serosanguinous output. Incisions healing in good order. Pain controlled.    Plan:  HOMA stark.  Continue GI low irritant " diet. Discontinue IV fluids.  Add milk of magnesia BID. Monitor bowel function.  Out of bed, ambulate, IS.   Continue abdominal binder.  Drains 2 and 3 (lower drains) to stay in place per Dr. Saab.  Upper drains to remain until output less than 30cc/day.    Hailey Swift MD  General, Robotic and Endoscopic Surgery  [facility]    [address], [address]  [place], [place], [address]  P: [phone]  F: [phone]          Electronically signed by Hailey Swift MD at 12/21/24 114       Eladio Peña MD at 12/20/24 1802          Postoperative day 1 status post incisional hernia repair with bilateral component separation, mesh placement, panniculectomy and abdominal adjacent tissue rearrangement    S: No events overnight.  Patient feels well.  Low urine output this morning.  Denies any nausea or vomiting.  She has been able to tolerate clear liquid diet.    O:   Vitals:    12/20/24 0430 12/20/24 0922 12/20/24 1335 12/20/24 1733   BP: 93/64 152/65 112/73 145/83   BP Location: Right arm Right arm Left arm Left arm   Patient Position: Lying Lying Sitting Lying   Pulse: 79 99 81 94   Resp: 16 16 16 16   Temp: 99.5 °F (37.5 °C) 99 °F (37.2 °C) 97.3 °F (36.3 °C) 99.3 °F (37.4 °C)   TempSrc: Oral Oral Oral Oral   SpO2: 97% 93% 96% 92%   Weight:       Height:          David drain:  Drains in between 5 and 45 cc.  1 drain with 130 cc, all drains are serosanguineous    Alert, no acute distress  Breathing comfortable  Regular rate rhythm  Abdomen soft, probably tender, nondistended, incisions with dressing clean dry and intact    White blood cell count 20,000, hemoglobin 10.9 from 13 preop, platelets within normal limits, sodium 134, CO2 20.3, creatinine 1.5 from 0.8, glucose 129, calcium 8.4, otherwise normal labs    Assessment and plan    Postoperative day 1 s/p incisional hernia repair with bilateral component separation, mesh placement, panniculectomy and abdominal adjacent tissue  rearrangement.  Patient with postoperative acute kidney injury likely related to dehydration.  She is otherwise doing extremely well, leukocytosis as expected.  Slightly drop of hemoglobin as expected postop.  There is no signs of active bleeding other than small amount of blood and the lower drains.    Continue normal saline 125 cc, bolus liter of fluid now  Advance to full liquid diet  SCDs, continue Lovenox  Physical therapy, out of bed to chair and ambulate  Continue drain care    She understood    Electronically signed by Eladio Peña MD at 12/20/24 1812       Del Saab MD at 12/20/24 1340            SUBJECTIVE:   She is doing well, has tolerated some p.o. intake with clear liquids.  She has been up and walking a little bit and is now sitting in a chair.  No particular concerns.    OBJECTIVE:  Vitals:    12/20/24 1335   BP: 112/73   Pulse: 81   Resp: 16   Temp: 97.3 °F (36.3 °C)   SpO2: 96%     Physical Exam  Incisions clean dry and intact, no evidence of edge necrosis, no ecchymosis, drains starting to thin out most then serosanguineous drainage.  No palpable fluid collection  PLAN:  Postop day 1 large ventral hernia repair and panniculectomy and abdominal adjacent tissue rearrangement.  -She is doing well from plastic surgery standpoint  - Antibiotics per general surgery  -Okay to ambulate ad clara. another activity can increase per general surgery plans  - ok to change dry dressings as needed, keep abdominal binder in place  -We discussed her drains may stay in a couple of weeks, she has to follow-up in my office on 1220      Electronically signed by Del Saab MD at 12/20/24 8457

## (undated) RX ORDER — ERYTHROMYCIN 5 MG/G
OINTMENT OPHTHALMIC
Status: DISPENSED
Start: 2022-02-01

## (undated) RX ORDER — LIDOCAINE HYDROCHLORIDE AND EPINEPHRINE 10; 10 MG/ML; UG/ML
INJECTION, SOLUTION INFILTRATION; PERINEURAL
Status: DISPENSED
Start: 2021-04-27

## (undated) RX ORDER — ERYTHROMYCIN 5 MG/G
OINTMENT OPHTHALMIC
Status: DISPENSED
Start: 2021-03-23

## (undated) RX ORDER — ERYTHROMYCIN 5 MG/G
OINTMENT OPHTHALMIC
Status: DISPENSED
Start: 2019-11-21

## (undated) RX ORDER — DEXAMETHASONE SODIUM PHOSPHATE 4 MG/ML
INJECTION, SOLUTION INTRA-ARTICULAR; INTRALESIONAL; INTRAMUSCULAR; INTRAVENOUS; SOFT TISSUE
Status: DISPENSED
Start: 2021-04-27

## (undated) RX ORDER — LIDOCAINE HYDROCHLORIDE AND EPINEPHRINE 10; 10 MG/ML; UG/ML
INJECTION, SOLUTION INFILTRATION; PERINEURAL
Status: DISPENSED
Start: 2021-03-23

## (undated) RX ORDER — ERYTHROMYCIN 5 MG/G
OINTMENT OPHTHALMIC
Status: DISPENSED
Start: 2023-10-03

## (undated) RX ORDER — ERYTHROMYCIN 5 MG/G
OINTMENT OPHTHALMIC
Status: DISPENSED
Start: 2021-02-02

## (undated) RX ORDER — ERYTHROMYCIN 5 MG/G
OINTMENT OPHTHALMIC
Status: DISPENSED
Start: 2021-04-27